# Patient Record
Sex: FEMALE | Race: WHITE | NOT HISPANIC OR LATINO | Employment: FULL TIME | URBAN - METROPOLITAN AREA
[De-identification: names, ages, dates, MRNs, and addresses within clinical notes are randomized per-mention and may not be internally consistent; named-entity substitution may affect disease eponyms.]

---

## 2023-05-03 DIAGNOSIS — Z00.00 ENCOUNTER FOR GENERAL ADULT MEDICAL EXAMINATION WITHOUT ABNORMAL FINDINGS: ICD-10-CM

## 2023-05-03 RX ORDER — MELOXICAM 15 MG/1
TABLET ORAL
Qty: 30 TABLET | Refills: 1 | Status: SHIPPED | OUTPATIENT
Start: 2023-05-03 | End: 2023-06-12 | Stop reason: SDUPTHER

## 2023-05-13 DIAGNOSIS — Z00.00 ENCOUNTER FOR GENERAL ADULT MEDICAL EXAMINATION WITHOUT ABNORMAL FINDINGS: ICD-10-CM

## 2023-05-15 RX ORDER — LISINOPRIL 20 MG/1
20 TABLET ORAL DAILY
Qty: 90 TABLET | Refills: 3 | Status: SHIPPED | OUTPATIENT
Start: 2023-05-15 | End: 2023-06-12 | Stop reason: SDUPTHER

## 2023-06-06 PROBLEM — R73.09 ABNORMAL GLUCOSE LEVEL: Status: ACTIVE | Noted: 2023-06-06

## 2023-06-06 PROBLEM — G43.909 MIGRAINES: Status: ACTIVE | Noted: 2023-06-06

## 2023-06-06 PROBLEM — E04.9 GOITER: Status: ACTIVE | Noted: 2023-06-06

## 2023-06-06 PROBLEM — F45.41 STRESS HEADACHES: Status: ACTIVE | Noted: 2023-06-06

## 2023-06-06 PROBLEM — K58.0 IRRITABLE BOWEL SYNDROME WITH DIARRHEA: Status: ACTIVE | Noted: 2023-06-06

## 2023-06-06 PROBLEM — J02.9 SORE THROAT: Status: ACTIVE | Noted: 2023-06-06

## 2023-06-06 PROBLEM — R42 VERTIGO: Status: ACTIVE | Noted: 2023-06-06

## 2023-06-06 PROBLEM — M19.90 ARTHRITIS PAIN: Status: ACTIVE | Noted: 2023-06-06

## 2023-06-06 PROBLEM — K21.9 GERD (GASTROESOPHAGEAL REFLUX DISEASE): Status: ACTIVE | Noted: 2023-06-06

## 2023-06-06 PROBLEM — H93.8X9 EAR PRESSURE: Status: ACTIVE | Noted: 2023-06-06

## 2023-06-06 PROBLEM — U07.1 DISEASE DUE TO SEVERE ACUTE RESPIRATORY SYNDROME CORONAVIRUS 2 (SARS-COV-2): Status: ACTIVE | Noted: 2023-06-06

## 2023-06-06 PROBLEM — M62.830 BACK SPASM: Status: ACTIVE | Noted: 2023-06-06

## 2023-06-06 PROBLEM — J02.9 ACUTE PHARYNGITIS: Status: ACTIVE | Noted: 2023-06-06

## 2023-06-06 PROBLEM — I10 BENIGN ESSENTIAL HYPERTENSION: Status: ACTIVE | Noted: 2023-06-06

## 2023-06-06 PROBLEM — R05.3 PERSISTENT COUGH: Status: ACTIVE | Noted: 2023-06-06

## 2023-06-06 PROBLEM — J30.9 ALLERGIC RHINITIS: Status: ACTIVE | Noted: 2023-06-06

## 2023-06-06 LAB
ALANINE AMINOTRANSFERASE (SGPT) (U/L) IN SER/PLAS: 16 U/L (ref 7–45)
ALBUMIN (G/DL) IN SER/PLAS: 4.3 G/DL (ref 3.4–5)
ALKALINE PHOSPHATASE (U/L) IN SER/PLAS: 64 U/L (ref 33–110)
ANION GAP IN SER/PLAS: 13 MMOL/L (ref 10–20)
ASPARTATE AMINOTRANSFERASE (SGOT) (U/L) IN SER/PLAS: 18 U/L (ref 9–39)
BASOPHILS (10*3/UL) IN BLOOD BY AUTOMATED COUNT: 0.05 X10E9/L (ref 0–0.1)
BASOPHILS/100 LEUKOCYTES IN BLOOD BY AUTOMATED COUNT: 0.8 % (ref 0–2)
BILIRUBIN TOTAL (MG/DL) IN SER/PLAS: 0.5 MG/DL (ref 0–1.2)
CALCIUM (MG/DL) IN SER/PLAS: 9.4 MG/DL (ref 8.6–10.3)
CARBON DIOXIDE, TOTAL (MMOL/L) IN SER/PLAS: 27 MMOL/L (ref 21–32)
CHLORIDE (MMOL/L) IN SER/PLAS: 106 MMOL/L (ref 98–107)
CHOLESTEROL (MG/DL) IN SER/PLAS: 179 MG/DL (ref 0–199)
CHOLESTEROL IN HDL (MG/DL) IN SER/PLAS: 48.5 MG/DL
CHOLESTEROL/HDL RATIO: 3.7
CREATININE (MG/DL) IN SER/PLAS: 0.92 MG/DL (ref 0.5–1.05)
EOSINOPHILS (10*3/UL) IN BLOOD BY AUTOMATED COUNT: 0.18 X10E9/L (ref 0–0.7)
EOSINOPHILS/100 LEUKOCYTES IN BLOOD BY AUTOMATED COUNT: 3 % (ref 0–6)
ERYTHROCYTE DISTRIBUTION WIDTH (RATIO) BY AUTOMATED COUNT: 12.3 % (ref 11.5–14.5)
ERYTHROCYTE MEAN CORPUSCULAR HEMOGLOBIN CONCENTRATION (G/DL) BY AUTOMATED: 32.4 G/DL (ref 32–36)
ERYTHROCYTE MEAN CORPUSCULAR VOLUME (FL) BY AUTOMATED COUNT: 92 FL (ref 80–100)
ERYTHROCYTES (10*6/UL) IN BLOOD BY AUTOMATED COUNT: 4.53 X10E12/L (ref 4–5.2)
GFR FEMALE: 72 ML/MIN/1.73M2
GLUCOSE (MG/DL) IN SER/PLAS: 82 MG/DL (ref 74–99)
HEMATOCRIT (%) IN BLOOD BY AUTOMATED COUNT: 41.7 % (ref 36–46)
HEMOGLOBIN (G/DL) IN BLOOD: 13.5 G/DL (ref 12–16)
IMMATURE GRANULOCYTES/100 LEUKOCYTES IN BLOOD BY AUTOMATED COUNT: 0.3 % (ref 0–0.9)
LDL: 117 MG/DL (ref 0–99)
LEUKOCYTES (10*3/UL) IN BLOOD BY AUTOMATED COUNT: 6.1 X10E9/L (ref 4.4–11.3)
LYMPHOCYTES (10*3/UL) IN BLOOD BY AUTOMATED COUNT: 1.93 X10E9/L (ref 1.2–4.8)
LYMPHOCYTES/100 LEUKOCYTES IN BLOOD BY AUTOMATED COUNT: 31.6 % (ref 13–44)
MONOCYTES (10*3/UL) IN BLOOD BY AUTOMATED COUNT: 0.41 X10E9/L (ref 0.1–1)
MONOCYTES/100 LEUKOCYTES IN BLOOD BY AUTOMATED COUNT: 6.7 % (ref 2–10)
NEUTROPHILS (10*3/UL) IN BLOOD BY AUTOMATED COUNT: 3.51 X10E9/L (ref 1.2–7.7)
NEUTROPHILS/100 LEUKOCYTES IN BLOOD BY AUTOMATED COUNT: 57.6 % (ref 40–80)
PLATELETS (10*3/UL) IN BLOOD AUTOMATED COUNT: 206 X10E9/L (ref 150–450)
POTASSIUM (MMOL/L) IN SER/PLAS: 4.2 MMOL/L (ref 3.5–5.3)
PROTEIN TOTAL: 6.7 G/DL (ref 6.4–8.2)
SODIUM (MMOL/L) IN SER/PLAS: 142 MMOL/L (ref 136–145)
THYROTROPIN (MIU/L) IN SER/PLAS BY DETECTION LIMIT <= 0.05 MIU/L: 2.79 MIU/L (ref 0.44–3.98)
TRIGLYCERIDE (MG/DL) IN SER/PLAS: 66 MG/DL (ref 0–149)
UREA NITROGEN (MG/DL) IN SER/PLAS: 16 MG/DL (ref 6–23)
VLDL: 13 MG/DL (ref 0–40)

## 2023-06-12 ENCOUNTER — OFFICE VISIT (OUTPATIENT)
Dept: PRIMARY CARE | Facility: CLINIC | Age: 58
End: 2023-06-12
Payer: COMMERCIAL

## 2023-06-12 VITALS
BODY MASS INDEX: 31.99 KG/M2 | DIASTOLIC BLOOD PRESSURE: 80 MMHG | WEIGHT: 216 LBS | SYSTOLIC BLOOD PRESSURE: 138 MMHG | HEART RATE: 85 BPM | TEMPERATURE: 97.4 F | RESPIRATION RATE: 16 BRPM | OXYGEN SATURATION: 98 % | HEIGHT: 69 IN

## 2023-06-12 DIAGNOSIS — Z00.00 ENCOUNTER FOR GENERAL ADULT MEDICAL EXAMINATION WITHOUT ABNORMAL FINDINGS: ICD-10-CM

## 2023-06-12 DIAGNOSIS — R42 VERTIGO: Primary | ICD-10-CM

## 2023-06-12 DIAGNOSIS — E55.9 VITAMIN D DEFICIENCY: ICD-10-CM

## 2023-06-12 DIAGNOSIS — E04.9 GOITER: ICD-10-CM

## 2023-06-12 PROCEDURE — 3075F SYST BP GE 130 - 139MM HG: CPT | Performed by: INTERNAL MEDICINE

## 2023-06-12 PROCEDURE — 1036F TOBACCO NON-USER: CPT | Performed by: INTERNAL MEDICINE

## 2023-06-12 PROCEDURE — 99396 PREV VISIT EST AGE 40-64: CPT | Performed by: INTERNAL MEDICINE

## 2023-06-12 PROCEDURE — 3079F DIAST BP 80-89 MM HG: CPT | Performed by: INTERNAL MEDICINE

## 2023-06-12 RX ORDER — IBUPROFEN 600 MG/1
600 TABLET ORAL EVERY 6 HOURS PRN
COMMUNITY
Start: 2016-08-25

## 2023-06-12 RX ORDER — MELOXICAM 15 MG/1
TABLET ORAL
Qty: 90 TABLET | Refills: 3 | Status: SHIPPED | OUTPATIENT
Start: 2023-06-12

## 2023-06-12 RX ORDER — FAMOTIDINE 20 MG/1
20 TABLET, FILM COATED ORAL 2 TIMES DAILY
COMMUNITY

## 2023-06-12 RX ORDER — ERGOCALCIFEROL 1.25 MG/1
50000 CAPSULE ORAL
COMMUNITY
Start: 2015-10-28 | End: 2023-06-12 | Stop reason: SDUPTHER

## 2023-06-12 RX ORDER — FLUTICASONE PROPIONATE 50 MCG
2 SPRAY, SUSPENSION (ML) NASAL DAILY
COMMUNITY
End: 2023-06-15

## 2023-06-12 RX ORDER — MECLIZINE HYDROCHLORIDE 25 MG/1
25 TABLET ORAL 3 TIMES DAILY PRN
Qty: 30 TABLET | Refills: 3 | Status: SHIPPED | OUTPATIENT
Start: 2023-06-12

## 2023-06-12 RX ORDER — OMEGA-3 FATTY ACIDS/FISH OIL 300-500 MG
CAPSULE ORAL
COMMUNITY

## 2023-06-12 RX ORDER — METHOCARBAMOL 500 MG/1
500 TABLET, FILM COATED ORAL
COMMUNITY

## 2023-06-12 RX ORDER — MECLIZINE HYDROCHLORIDE 25 MG/1
25 TABLET ORAL
COMMUNITY
Start: 2021-02-12 | End: 2023-06-12 | Stop reason: SDUPTHER

## 2023-06-12 RX ORDER — ERGOCALCIFEROL 1.25 MG/1
50000 CAPSULE ORAL
Qty: 12 CAPSULE | Refills: 3 | Status: SHIPPED | OUTPATIENT
Start: 2023-06-12

## 2023-06-12 RX ORDER — LISINOPRIL 20 MG/1
20 TABLET ORAL DAILY
Qty: 90 TABLET | Refills: 3 | Status: SHIPPED | OUTPATIENT
Start: 2023-06-12

## 2023-06-12 ASSESSMENT — ENCOUNTER SYMPTOMS
DIZZINESS: 1
ABDOMINAL PAIN: 0
TROUBLE SWALLOWING: 0
DIARRHEA: 1

## 2023-06-12 ASSESSMENT — PATIENT HEALTH QUESTIONNAIRE - PHQ9
SUM OF ALL RESPONSES TO PHQ9 QUESTIONS 1 AND 2: 0
2. FEELING DOWN, DEPRESSED OR HOPELESS: NOT AT ALL
1. LITTLE INTEREST OR PLEASURE IN DOING THINGS: NOT AT ALL

## 2023-06-12 NOTE — PROGRESS NOTES
Patient here for a physical     Subjective   Patient ID: Iris Fraire is a 57 y.o. female who presents for Annual Exam.    The patient reports ongoing intermittent episodes of vertigo, particularly when she turns her head to the right side.  She manages symptoms by taking meclizine 25mg as 1 tablet as needed, and finds this has been working well.    The patient has scheduled her routine screening colonoscopy on 6/16/2023.  She recalls that her most recent endoscopy was generally unremarkable and was negative for H.pylori.  She has been taking Pepcid on an as needed basis.  She denies any dysphagia or abdominal pain, but endorses loose stools associated with IBS.  She continues to follow with Gastroenterology.    The patient reports hip pain due to osteoarthritis, and has been taking meloxicam 15 mg once daily for pain control.    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    The patient denies any hearing impairment or vision changes and wears prescription lenses.    The patient continues to take Vitamin D2 as 95544 units once monthly, and has been doing so for some time.    The patient's grandmother has a history of thyroid disease treated with Synthroid.        Review of Systems   HENT:  Negative for hearing loss and trouble swallowing.    Eyes:  Negative for visual disturbance.   Gastrointestinal:  Positive for diarrhea. Negative for abdominal pain.   Neurological:  Positive for dizziness.   Psychiatric/Behavioral:          Positive for increased life stress.       Objective   Physical Exam  Constitutional:       Appearance: Normal appearance.   Cardiovascular:      Rate and Rhythm: Normal rate and regular rhythm.      Heart sounds: Normal heart sounds.      Comments: No carotid bruit bilaterally.  Pulmonary:      Effort: Pulmonary effort is normal.      Breath sounds: Normal breath sounds.   Abdominal:      General: Bowel sounds are normal.       Palpations: Abdomen is soft.      Tenderness: There is abdominal tenderness.   Skin:     General: Skin is warm and dry.   Neurological:      General: No focal deficit present.      Mental Status: She is alert and oriented to person, place, and time. Mental status is at baseline.   Psychiatric:         Mood and Affect: Mood normal.         Behavior: Behavior normal.         Assessment/Plan   Problem List Items Addressed This Visit       Goiter    Relevant Orders    US thyroid    Vertigo - Primary    Relevant Medications    meclizine (Antivert) 25 mg tablet    Other Relevant Orders    Referral to Physical Therapy     Other Visit Diagnoses       Encounter for general adult medical examination without abnormal findings        Relevant Medications    meloxicam (Mobic) 15 mg tablet    lisinopril 20 mg tablet    Vitamin D deficiency        Relevant Medications    ergocalciferol (Vitamin D-2) 1.25 MG (81747 UT) capsule            CPE Performed  -  Discussed healthy diet and regular exercise.    -  Physical exam overall unremarkable. Immunizations reviewed and updated accordingly. Healthy lifestyle choices discussed (tobacco avoidance, appropriate alcohol use, avoidance of illicit substances).   -  Patient is wearing seatbelt.   -  Screening lab work ordered as indicated.    -  Age appropriate screening tests reviewed with patient.        IMPRESSION/PLAN :     Arthritis Pain  - Refilled Meloxicam 15mg, to be taken QD. Take w/ food and Pepcid for protection against adverse effects.  Encouraged patient to take sparingly.    BPPV   - Continue with meclizine 25mg as 1 tablet prn. Order given to patient to complete vestibular rehabilitation. Call if recurs or quality of vertigo changes.     Goiter   - Ultrasound of the thyroid performed on 04/2021 revealed Upper normal sized thyroid gland with few benign-appearing nodules (TIRADS 1), all less than 1 cm. No significant change.  Ordered repeat U/S.    HTN   - BP is 138/80 in office  today. Continue on lisinopril 20mg QD.      Allergic Rhinitis   - Fluticasone propionate 50mcg/act nasal spray refilled to be taken QD for seasonal allergy symptoms.    IBS with diarrhea   - Mild abdominal tenderness on exam.  Soft abdomen, no guarding or rigidity.  Recommended daily Metamucil to help regulate bowel movements. Following with GI.    Vitamin D Deficiency  - Continue with ergocalciferol Vitamin D2 as 47806 units once monthly.  Will recheck prior to next visit.     Health Maintenance   - Routine labs 6/2023. Last colonoscopy 6/2018, scheduled for repeat 6/16/2023- follows w/ Dr. Prather. Mammo and PAP through [facility].  ASCVD Risk 3.8%.     Follow up according to routine health maintenance, call sooner if neede       Scribe Attestation  By signing my name below, IMonika , Scribe   attest that this documentation has been prepared under the direction and in the presence of Deyvi Jackson DO.

## 2023-06-15 DIAGNOSIS — J30.9 ALLERGIC RHINITIS, UNSPECIFIED: ICD-10-CM

## 2023-06-15 RX ORDER — FLUTICASONE PROPIONATE 50 MCG
SPRAY, SUSPENSION (ML) NASAL
Qty: 48 ML | Refills: 1 | Status: SHIPPED | OUTPATIENT
Start: 2023-06-15

## 2023-08-16 ENCOUNTER — TELEPHONE (OUTPATIENT)
Dept: PRIMARY CARE | Facility: CLINIC | Age: 58
End: 2023-08-16
Payer: COMMERCIAL

## 2023-08-16 DIAGNOSIS — R05.9 COUGH, UNSPECIFIED TYPE: Primary | ICD-10-CM

## 2023-08-16 RX ORDER — HYDROCODONE BITARTRATE AND HOMATROPINE METHYLBROMIDE ORAL SOLUTION 5; 1.5 MG/5ML; MG/5ML
5 LIQUID ORAL EVERY 6 HOURS PRN
Qty: 100 ML | Refills: 0 | Status: SHIPPED | OUTPATIENT
Start: 2023-08-16 | End: 2023-08-21

## 2023-08-16 NOTE — TELEPHONE ENCOUNTER
Pt came home from [place] with cough and would like to get RX for cough syrup. Pt advised she does not have any other symptoms. No appt. Available this week. [facility]. Please advise

## 2023-08-18 ENCOUNTER — OFFICE VISIT (OUTPATIENT)
Dept: PRIMARY CARE | Facility: CLINIC | Age: 58
End: 2023-08-18
Payer: COMMERCIAL

## 2023-08-18 VITALS
BODY MASS INDEX: 32.14 KG/M2 | HEIGHT: 69 IN | OXYGEN SATURATION: 98 % | WEIGHT: 217 LBS | HEART RATE: 67 BPM | TEMPERATURE: 97 F | DIASTOLIC BLOOD PRESSURE: 76 MMHG | SYSTOLIC BLOOD PRESSURE: 122 MMHG

## 2023-08-18 DIAGNOSIS — J06.9 URTI (ACUTE UPPER RESPIRATORY INFECTION): Primary | ICD-10-CM

## 2023-08-18 PROCEDURE — 99214 OFFICE O/P EST MOD 30 MIN: CPT | Performed by: INTERNAL MEDICINE

## 2023-08-18 PROCEDURE — 3078F DIAST BP <80 MM HG: CPT | Performed by: INTERNAL MEDICINE

## 2023-08-18 PROCEDURE — 3074F SYST BP LT 130 MM HG: CPT | Performed by: INTERNAL MEDICINE

## 2023-08-18 PROCEDURE — 1036F TOBACCO NON-USER: CPT | Performed by: INTERNAL MEDICINE

## 2023-08-18 RX ORDER — AZITHROMYCIN 250 MG/1
TABLET, FILM COATED ORAL
Qty: 6 TABLET | Refills: 0 | Status: SHIPPED | OUTPATIENT
Start: 2023-08-18 | End: 2023-08-23

## 2023-08-18 RX ORDER — BENZONATATE 100 MG/1
100 CAPSULE ORAL 3 TIMES DAILY PRN
Qty: 42 CAPSULE | Refills: 0 | Status: SHIPPED | OUTPATIENT
Start: 2023-08-18 | End: 2023-09-17

## 2023-08-18 NOTE — PROGRESS NOTES
Subjective   Patient ID: [id number]     Iris Fraire is a 58 y.o. female who presents for Cough (Cough x 5 days and negative for covid).    Current Outpatient Medications:     ergocalciferol (Vitamin D-2) 1.25 MG (89865 UT) capsule, Take 1 capsule (50,000 Units) by mouth every 30 (thirty) days., Disp: 12 capsule, Rfl: 3    famotidine (Pepcid) 20 mg tablet, Take 1 tablet (20 mg) by mouth twice a day., Disp: , Rfl:     fluticasone (Flonase) 50 mcg/actuation nasal spray, SPRAY 2 SPRAYS INTO EACH NOSTRIL EVERY DAY, Disp: 48 mL, Rfl: 1    hydrocodone-homatropine (Hycodan) 5-1.5 mg/5 mL syrup, Take 5 mL by mouth every 6 hours if needed for cough for up to 5 days., Disp: 100 mL, Rfl: 0    ibuprofen 600 mg tablet, Take 1 tablet (600 mg) by mouth every 6 hours if needed., Disp: , Rfl:     lisinopril 20 mg tablet, Take 1 tablet (20 mg) by mouth once daily., Disp: 90 tablet, Rfl: 3    meclizine (Antivert) 25 mg tablet, Take 1 tablet (25 mg) by mouth 3 times a day as needed for dizziness., Disp: 30 tablet, Rfl: 3    meloxicam (Mobic) 15 mg tablet, TAKE 1 TABLET DAILY WITH FOOD AS NEEDED FOR PAIN, Disp: 90 tablet, Rfl: 3    methocarbamol (Robaxin) 500 mg tablet, Take 1 tablet (500 mg) by mouth., Disp: , Rfl:     omega-3 fatty acids-fish oil (Fish OiL) 300-500 mg capsule, Take by mouth., Disp: , Rfl:     azithromycin (Zithromax) 250 mg tablet, Take 2 tablets (500 mg) by mouth once daily for 1 day, THEN 1 tablet (250 mg) once daily for 4 days. Take 2 tabs (500 mg) by mouth today, than 1 daily for 4 days.., Disp: 6 tablet, Rfl: 0    benzonatate (Tessalon) 100 mg capsule, Take 1 capsule (100 mg) by mouth 3 times a day as needed for cough. Do not crush or chew., Disp: 42 capsule, Rfl: 0  HPI  58-year-old patient presenting to the office today to discuss concerns regarding respiratory symptoms.  Patient just came back from vacation in [place] and was on the last day of vacation which is about 5 days ago she started experiencing  "irritation in the back of her throat and cough.  Her cough progressed over the last week and it significantly bothersome mainly at night with minimal sputum production.  She does have irritation in the back of her throat and postnasal drainage.  No sinus pressure congestion no fever chills or rigors.  Review of system was reviewed all normal except what is noted in HPI   No past medical history on file.   Objective   /76 (BP Location: Right arm, Patient Position: Sitting)   Pulse 67   Temp 36.1 °C (97 °F) (Temporal)   Ht 1.74 m (5' 8.5\")   Wt 98.4 kg (217 lb)   SpO2 98%   BMI 32.51 kg/m²      Physical Exam  Constitutional:       Appearance: Normal appearance.   HENT:      Head: Normocephalic and atraumatic.      Mouth/Throat:      Mouth: Mucous membranes are moist.      Pharynx: Posterior oropharyngeal erythema present. No oropharyngeal exudate.   Eyes:      Extraocular Movements: Extraocular movements intact.      Pupils: Pupils are equal, round, and reactive to light.   Cardiovascular:      Rate and Rhythm: Normal rate and regular rhythm.      Pulses: Normal pulses.      Heart sounds: Normal heart sounds.   Pulmonary:      Effort: Pulmonary effort is normal.      Breath sounds: Normal breath sounds.   Neurological:      Mental Status: She is alert.         Assessment/Plan   Problem List Items Addressed This Visit    None  Visit Diagnoses       URTI (acute upper respiratory infection)    -  Primary    Relevant Medications    azithromycin (Zithromax) 250 mg tablet    benzonatate (Tessalon) 100 mg capsule          58-year-old patient with the following issues.    1.  Symptoms suggestive of upper respiratory tract infection at this point we are going to treat the patient with antibiotic namely Zithromax and benzonatate for cough control.  I instructed the patient to take 1 g of vitamin C daily and to drink adequate fluid and use lozenges for irritation in the throat.  Patient stated understanding she was " instructed to seek medical care if her symptoms worsen or do not improve.  Ruthy Escobar MD

## 2023-10-03 ENCOUNTER — EVALUATION (OUTPATIENT)
Dept: PHYSICAL THERAPY | Facility: CLINIC | Age: 58
End: 2023-10-03
Payer: COMMERCIAL

## 2023-10-03 DIAGNOSIS — R42 DIZZINESS: Primary | ICD-10-CM

## 2023-10-03 DIAGNOSIS — T75.3XXA MOTION SICKNESS, INITIAL ENCOUNTER: ICD-10-CM

## 2023-10-03 PROCEDURE — 97161 PT EVAL LOW COMPLEX 20 MIN: CPT | Mod: GP

## 2023-10-03 PROCEDURE — 97112 NEUROMUSCULAR REEDUCATION: CPT | Mod: GP

## 2023-10-03 ASSESSMENT — ENCOUNTER SYMPTOMS
DEPRESSION: 0
LOSS OF SENSATION IN FEET: 0
OCCASIONAL FEELINGS OF UNSTEADINESS: 1

## 2023-10-03 NOTE — Clinical Note
October 3, 2023     Patient: Iris Fraire   YOB: 1965   Date of Visit: 10/3/2023       To Whom it May Concern:    Iris Fraire was seen in my clinic on 10/3/2023. She {Return to school/sport:65928}.    If you have any questions or concerns, please don't hesitate to call.         Sincerely,          Sherman Morse, PT        CC: No Recipients

## 2023-10-03 NOTE — PROGRESS NOTES
Physical Therapy    Physical Therapy Evaluation    Patient Name: Iris Fraire  MRN: [id number]  Today's Date: 10/3/2023  Time Calculation  Start Time: 1124  Stop Time: 1212  Time Calculation (min): 48 min  Visit Number:  1  Insurance Information:  50 visits/year    Therapy Diagnosis:  -Dizziness R42  -Motion Sickness T75.3XXA      Assessment: Iris Fraire is a 58 year old F referred to outpatient PT for dizziness. Objective testing of vestibular, oculomotor, orthostatic screening, and functional mvmnt screenings were unable to provoke pt's familiar sx.  Based on subjective information pt may have some degree of motion sensitivity. However, it is difficult to ascertain a specific cause for pt's dizziness at this time, due to being unable to provoke pt's familiar sx. As a result, recommending no skilled vestibular PT needs at this time. Pt to follow up as necessary during more acute flare up of familiar sx.     Treatment: 10min (Neuro re-ed)  Pt educated on multifactorial nature of dizziness including but not limited to vestibular, cardiovascular, and visual systems. Pt educated on role of this visit in ruling out vestibular system causes at this time, and importance of noting trends of sx provocation going forward to help determine contributing factors.     Problems include:  Dizziness, lightheadedness, unsteady gait.     Goals:  No goals established.       Plan:  No skilled vestibular PT needs at this time. Pt to follow up as necessary.     Activities that may be performed include but are not limited to:  balance, gait, transfers, modalities (as appropriate), e-stim (as appropriate), canalith repositioning maneuvers, therapeutic exercise, therapeutic activities.    Iris Fraire in agreement with and understanding of all discussed this date.    ----------------------------------  ----------------------------------    Subjective:  Pt reports experiencing intermittent bouts of dizzy spells and lightheadedness  "beginning last spring (2023). Pt reports sx have included feeling as though the floor was moving, room was spinning, abnormal gait and lightheadedness. Pt reports the most recent episode was approx 1 week ago after quickly getting out of bed. Other episodes brought on by quick head mvmnts and rolling over in bed. Pt reports 1 episode several months where she lowered herself to ground d/t room spinning to avoid a fall before going to ED. Pt reports taking meclizine PRN which has helped. Pt reports she wants to figure out if there is a maneuver to get these episodes to stop.     Onset Date:  Spring 2023.     HPI:  Iris Fraire is a 58 year old F referred to outpatient PT for dizziness.    note:  \"y\" = yes; \"n\" = no)    Hx of:   - Anxiety n  - Headaches / migraines (photo/phonophobia) y, Hasn't had migraines in a while.   - Concussion n  - CVA n  - Neck pain n, WFL   - TMD n  - Motion sensitivity (car, boat) y, gets car sick only in back seat.   - Sinus infections n  - Ear infections y, as a kid  - Heart conditions (afib, HTN, OH) n  - DM n  - Autoimmune disorders   - Thyroid disorders multinodular goiters  - Alcohol use y, 1/2-3months  - Caffeine use y, 2 cups coffee/day    Hearing:  - Loss (sudden or gradual) y, partially deaf L ear.   - Tinnitus y, occasional ringing with dizzy spells.   - Audiogram n  - Autophony n    Eye Conditions: y, potential cataract.     Vestibular Tests:   - VNG n  - VEMP n    Imaging:  - CT y, reports normal results  - MRI y, reports normal results     ----------------------------------  ----------------------------------    OBJECTIVE    Observation: No gross abnormalities noted w/functional mobility.     ROM: Cervical spine ROM WFL, pt noted \"creakiness\" w/out pain.     Palpation: No sx provocation, pain or trigger points noted w/palpation of suboccipital, upper trap, scalene, and levator scapula musculature.     Strength: No gross abnormalities noted w/functional mobility. "     Coordination: No gross abnormalities noted w/functional mobility.     Vestibular: (“g” = goggles)  Occulomotor -   (Key: n = negative, p = positive)  - ROM: n  - Smooth Pursuit:  n  - Horizontal Saccades:  n  - Vertical Saccades:  n  - Eye Alignment:  n  - Static Visual Acuity: n  - Cover:  n  - Uncover:  n  - Cross Cover:  n  - Skew Deviation:  n  - Convergence:  n    Vestibular-Specific:  - Spontaneous Nystagmus: n  - Gaze Evoked Nystagmus:  n  - Horizontal VOR: n  - Vertical VOR:  n  - R Head Thrust:  n  - L Head Thrust:  n    Positional Testing:  - R DHP: n  - R HRT:  n  - L DHP: n  - L HRT:  n    Orthostatic Screening:  Position Supine Standing   Method Automatic Automatic   Placement L Upper arm L upper arm   BP Measure (mmHg) 135/68 136/67   HR (bpm) 65 77         Functional Mobility Assessment:  - Gait: No gross abnormalities noted w/functional mobility  - Transfers:  No gross abnormalities noted w/functional mobility.   - Bed Mobility:  No gross abnormalities noted w/functional mobility.   - Other: No sx provocation with the following:  Turn 360 degrees to the R & L, Bend forward from the waist to  object.     Outcomes:  DHI:  14% impairment

## 2023-10-03 NOTE — Clinical Note
October 3, 2023     Patient: Iris Fraire   YOB: 1965   Date of Visit: 10/3/2023       To Whom It May Concern:    It is my medical opinion that Iris Fraire {Work release (duty restriction):97196}.    If you have any questions or concerns, please don't hesitate to call.         Sincerely,        Sherman Morse, PT    CC: No Recipients

## 2023-12-16 ENCOUNTER — HOSPITAL ENCOUNTER (OUTPATIENT)
Dept: RADIOLOGY | Facility: HOSPITAL | Age: 58
Discharge: HOME | End: 2023-12-16
Payer: COMMERCIAL

## 2023-12-16 DIAGNOSIS — E04.9 NONTOXIC GOITER, UNSPECIFIED: ICD-10-CM

## 2023-12-16 PROCEDURE — 76536 US EXAM OF HEAD AND NECK: CPT

## 2023-12-16 PROCEDURE — 76536 US EXAM OF HEAD AND NECK: CPT | Performed by: RADIOLOGY

## 2023-12-19 DIAGNOSIS — K58.9 IRRITABLE BOWEL SYNDROME, UNSPECIFIED TYPE: Primary | ICD-10-CM

## 2023-12-19 RX ORDER — HYOSCYAMINE SULFATE 0.125 MG
0.12 TABLET ORAL 2 TIMES DAILY PRN
COMMUNITY
End: 2023-12-19 | Stop reason: SDUPTHER

## 2023-12-19 RX ORDER — HYOSCYAMINE SULFATE 0.125 MG
0.12 TABLET ORAL 2 TIMES DAILY PRN
Qty: 30 TABLET | Refills: 1 | Status: SHIPPED | OUTPATIENT
Start: 2023-12-19 | End: 2024-01-03 | Stop reason: SDUPTHER

## 2024-01-03 DIAGNOSIS — K58.9 IRRITABLE BOWEL SYNDROME, UNSPECIFIED TYPE: ICD-10-CM

## 2024-01-03 RX ORDER — HYOSCYAMINE SULFATE 0.125 MG
0.12 TABLET ORAL 2 TIMES DAILY PRN
Qty: 30 TABLET | Refills: 0 | Status: SHIPPED | OUTPATIENT
Start: 2024-01-03 | End: 2024-01-05 | Stop reason: SDUPTHER

## 2024-01-05 DIAGNOSIS — K58.9 IRRITABLE BOWEL SYNDROME, UNSPECIFIED TYPE: ICD-10-CM

## 2024-01-05 RX ORDER — HYOSCYAMINE SULFATE 0.125 MG
0.12 TABLET ORAL 2 TIMES DAILY PRN
Qty: 180 TABLET | Refills: 0 | Status: SHIPPED | OUTPATIENT
Start: 2024-01-05

## 2024-05-28 ENCOUNTER — TELEPHONE (OUTPATIENT)
Dept: PRIMARY CARE | Facility: CLINIC | Age: 59
End: 2024-05-28
Payer: COMMERCIAL

## 2024-05-28 DIAGNOSIS — Z00.00 HEALTHCARE MAINTENANCE: Primary | ICD-10-CM

## 2024-06-12 ENCOUNTER — LAB (OUTPATIENT)
Dept: LAB | Facility: LAB | Age: 59
End: 2024-06-12
Payer: COMMERCIAL

## 2024-06-12 DIAGNOSIS — Z00.00 HEALTHCARE MAINTENANCE: ICD-10-CM

## 2024-06-12 LAB
ALBUMIN SERPL BCP-MCNC: 4.4 G/DL (ref 3.4–5)
ALP SERPL-CCNC: 64 U/L (ref 33–110)
ALT SERPL W P-5'-P-CCNC: 12 U/L (ref 7–45)
ANION GAP SERPL CALC-SCNC: 12 MMOL/L (ref 10–20)
AST SERPL W P-5'-P-CCNC: 15 U/L (ref 9–39)
BASOPHILS # BLD AUTO: 0.05 X10*3/UL (ref 0–0.1)
BASOPHILS NFR BLD AUTO: 0.8 %
BILIRUB SERPL-MCNC: 0.5 MG/DL (ref 0–1.2)
BUN SERPL-MCNC: 22 MG/DL (ref 6–23)
CALCIUM SERPL-MCNC: 9.5 MG/DL (ref 8.6–10.6)
CHLORIDE SERPL-SCNC: 106 MMOL/L (ref 98–107)
CHOLEST SERPL-MCNC: 175 MG/DL (ref 0–199)
CHOLESTEROL/HDL RATIO: 3.3
CO2 SERPL-SCNC: 29 MMOL/L (ref 21–32)
CREAT SERPL-MCNC: 0.89 MG/DL (ref 0.5–1.05)
EGFRCR SERPLBLD CKD-EPI 2021: 75 ML/MIN/1.73M*2
EOSINOPHIL # BLD AUTO: 0.12 X10*3/UL (ref 0–0.7)
EOSINOPHIL NFR BLD AUTO: 1.8 %
ERYTHROCYTE [DISTWIDTH] IN BLOOD BY AUTOMATED COUNT: 12.4 % (ref 11.5–14.5)
GLUCOSE SERPL-MCNC: 82 MG/DL (ref 74–99)
HCT VFR BLD AUTO: 42.3 % (ref 36–46)
HDLC SERPL-MCNC: 52.5 MG/DL
HGB BLD-MCNC: 14 G/DL (ref 12–16)
IMM GRANULOCYTES # BLD AUTO: 0.01 X10*3/UL (ref 0–0.7)
IMM GRANULOCYTES NFR BLD AUTO: 0.2 % (ref 0–0.9)
LDLC SERPL CALC-MCNC: 108 MG/DL
LYMPHOCYTES # BLD AUTO: 2.25 X10*3/UL (ref 1.2–4.8)
LYMPHOCYTES NFR BLD AUTO: 33.9 %
MCH RBC QN AUTO: 30.1 PG (ref 26–34)
MCHC RBC AUTO-ENTMCNC: 33.1 G/DL (ref 32–36)
MCV RBC AUTO: 91 FL (ref 80–100)
MONOCYTES # BLD AUTO: 0.41 X10*3/UL (ref 0.1–1)
MONOCYTES NFR BLD AUTO: 6.2 %
NEUTROPHILS # BLD AUTO: 3.8 X10*3/UL (ref 1.2–7.7)
NEUTROPHILS NFR BLD AUTO: 57.1 %
NON HDL CHOLESTEROL: 123 MG/DL (ref 0–149)
NRBC BLD-RTO: 0 /100 WBCS (ref 0–0)
PLATELET # BLD AUTO: 217 X10*3/UL (ref 150–450)
POTASSIUM SERPL-SCNC: 4.4 MMOL/L (ref 3.5–5.3)
PROT SERPL-MCNC: 6.3 G/DL (ref 6.4–8.2)
RBC # BLD AUTO: 4.65 X10*6/UL (ref 4–5.2)
SODIUM SERPL-SCNC: 143 MMOL/L (ref 136–145)
TRIGL SERPL-MCNC: 71 MG/DL (ref 0–149)
TSH SERPL-ACNC: 2.02 MIU/L (ref 0.44–3.98)
VLDL: 14 MG/DL (ref 0–40)
WBC # BLD AUTO: 6.6 X10*3/UL (ref 4.4–11.3)

## 2024-06-12 PROCEDURE — 84443 ASSAY THYROID STIM HORMONE: CPT

## 2024-06-12 PROCEDURE — 85025 COMPLETE CBC W/AUTO DIFF WBC: CPT

## 2024-06-12 PROCEDURE — 80061 LIPID PANEL: CPT

## 2024-06-12 PROCEDURE — 80053 COMPREHEN METABOLIC PANEL: CPT

## 2024-06-12 PROCEDURE — 36415 COLL VENOUS BLD VENIPUNCTURE: CPT

## 2024-06-23 DIAGNOSIS — Z00.00 ENCOUNTER FOR GENERAL ADULT MEDICAL EXAMINATION WITHOUT ABNORMAL FINDINGS: ICD-10-CM

## 2024-06-24 ENCOUNTER — APPOINTMENT (OUTPATIENT)
Dept: PRIMARY CARE | Facility: CLINIC | Age: 59
End: 2024-06-24
Payer: COMMERCIAL

## 2024-06-24 VITALS
RESPIRATION RATE: 16 BRPM | OXYGEN SATURATION: 95 % | HEART RATE: 87 BPM | DIASTOLIC BLOOD PRESSURE: 70 MMHG | WEIGHT: 214 LBS | BODY MASS INDEX: 31.7 KG/M2 | SYSTOLIC BLOOD PRESSURE: 120 MMHG | HEIGHT: 69 IN | TEMPERATURE: 96.8 F

## 2024-06-24 DIAGNOSIS — K21.9 GASTROESOPHAGEAL REFLUX DISEASE WITHOUT ESOPHAGITIS: Primary | ICD-10-CM

## 2024-06-24 DIAGNOSIS — J35.8 TONSIL STONE: ICD-10-CM

## 2024-06-24 DIAGNOSIS — N81.10 FEMALE BLADDER PROLAPSE: ICD-10-CM

## 2024-06-24 DIAGNOSIS — K58.9 IRRITABLE BOWEL SYNDROME, UNSPECIFIED TYPE: ICD-10-CM

## 2024-06-24 DIAGNOSIS — Z00.00 ENCOUNTER FOR GENERAL ADULT MEDICAL EXAMINATION WITHOUT ABNORMAL FINDINGS: ICD-10-CM

## 2024-06-24 DIAGNOSIS — M62.830 BACK SPASM: ICD-10-CM

## 2024-06-24 PROCEDURE — 99396 PREV VISIT EST AGE 40-64: CPT | Performed by: INTERNAL MEDICINE

## 2024-06-24 PROCEDURE — 3078F DIAST BP <80 MM HG: CPT | Performed by: INTERNAL MEDICINE

## 2024-06-24 PROCEDURE — 3074F SYST BP LT 130 MM HG: CPT | Performed by: INTERNAL MEDICINE

## 2024-06-24 PROCEDURE — 1036F TOBACCO NON-USER: CPT | Performed by: INTERNAL MEDICINE

## 2024-06-24 PROCEDURE — 93000 ELECTROCARDIOGRAM COMPLETE: CPT | Performed by: INTERNAL MEDICINE

## 2024-06-24 RX ORDER — HYOSCYAMINE SULFATE 0.125 MG
0.12 TABLET ORAL 2 TIMES DAILY PRN
Qty: 180 TABLET | Refills: 0 | Status: SHIPPED | OUTPATIENT
Start: 2024-06-24

## 2024-06-24 RX ORDER — LISINOPRIL 20 MG/1
20 TABLET ORAL DAILY
Qty: 90 TABLET | Refills: 3 | Status: SHIPPED | OUTPATIENT
Start: 2024-06-24

## 2024-06-24 RX ORDER — METHOCARBAMOL 500 MG/1
500 TABLET, FILM COATED ORAL 3 TIMES DAILY
Qty: 30 TABLET | Refills: 1 | Status: SHIPPED | OUTPATIENT
Start: 2024-06-24

## 2024-06-24 RX ORDER — MELOXICAM 15 MG/1
TABLET ORAL
Qty: 90 TABLET | Refills: 3 | Status: SHIPPED | OUTPATIENT
Start: 2024-06-24

## 2024-06-24 RX ORDER — MELOXICAM 15 MG/1
TABLET ORAL
Qty: 90 TABLET | Refills: 3 | Status: SHIPPED | OUTPATIENT
Start: 2024-06-24 | End: 2024-06-24 | Stop reason: SDUPTHER

## 2024-06-24 RX ORDER — FAMOTIDINE 20 MG/1
20 TABLET, FILM COATED ORAL DAILY
Qty: 90 TABLET | Refills: 2 | Status: SHIPPED | OUTPATIENT
Start: 2024-06-24

## 2024-06-24 ASSESSMENT — ENCOUNTER SYMPTOMS
DIARRHEA: 1
BACK PAIN: 1
SHORTNESS OF BREATH: 0
ABDOMINAL PAIN: 0
CONSTIPATION: 0
SLEEP DISTURBANCE: 1

## 2024-06-24 NOTE — PROGRESS NOTES
Patient here for a physical     Subjective   Patient ID: Iris Fraire is a 58 y.o. female who presents for Annual Exam.    The patient recently had a skin lesion removed that was confirmed to be skin cancer, and will be following up with her Dermatologist, , every six months.    The patient notes worsening tonsil stones particularly on one side.  This is associated with a foul odor that is unusual for the patient.     The patient is concerned about bladder prolapse despite completing physical therapy for pelvic floor therapy.  She has a history of hernia repair with mesh.  The patient has an upcoming appointment with Gynecology in 9/2024.     The patient reports significantly increased life stress related to caring for her mother who is experiencing progressive dementia.  This has resulted in sleep disturbance, as she has been unable to stay asleep for longer than an hour.        The patient has been taking meloxicam 15mg once daily for hip and back pain.  She has been trying to walk more regularly in an effort to improve physical conditioning.  The patient denies any dyspnea, chest pressure, or chest pain.     The patient states ongoing diarrhea which she believes is related to previous gastrointestinal surgery.  She completed a colonoscopy in 6/2023 and will be due for repeat in 6/2028.     The patient denies any significant hearing impairment or vision changes.        Review of Systems   HENT:  Negative for hearing loss.         Positive for tonsil stones.    Respiratory:  Negative for shortness of breath.    Cardiovascular:  Negative for chest pain.   Gastrointestinal:  Positive for diarrhea. Negative for abdominal pain and constipation.   Genitourinary:         Positive for bladder prolapse.   Musculoskeletal:  Positive for back pain.        Positive for hip pain.    Psychiatric/Behavioral:  Positive for sleep disturbance.         Positive for life stress.       Objective   Physical  Exam  Constitutional:       Appearance: Normal appearance.   Neck:      Vascular: No carotid bruit.   Cardiovascular:      Rate and Rhythm: Normal rate and regular rhythm.      Heart sounds: Normal heart sounds.   Pulmonary:      Effort: Pulmonary effort is normal.      Breath sounds: Normal breath sounds.   Abdominal:      General: Bowel sounds are normal.      Palpations: Abdomen is soft.      Tenderness: There is no abdominal tenderness.   Skin:     General: Skin is warm and dry.   Neurological:      General: No focal deficit present.      Mental Status: She is alert and oriented to person, place, and time. Mental status is at baseline.   Psychiatric:         Mood and Affect: Mood normal.         Behavior: Behavior normal.       Assessment/Plan   Problem List Items Addressed This Visit             ICD-10-CM    Back spasm M62.830    Relevant Medications    methocarbamol (Robaxin) 500 mg tablet    GERD (gastroesophageal reflux disease) - Primary K21.9    Relevant Medications    famotidine (Pepcid) 20 mg tablet     Other Visit Diagnoses         Codes    Encounter for general adult medical examination without abnormal findings     Z00.00    Relevant Medications    meloxicam (Mobic) 15 mg tablet    lisinopril 20 mg tablet    Irritable bowel syndrome, unspecified type     K58.9    Relevant Medications    hyoscyamine (Anaspaz, Levsin) 0.125 mg tablet    Tonsil stone     J35.8    Relevant Orders    Referral to ENT    Female bladder prolapse     N81.10    Relevant Orders    Referral to Urogynecology            CPE Performed  -  Discussed healthy diet and regular exercise.    -  Physical exam overall unremarkable. Immunizations reviewed and updated accordingly. Healthy lifestyle choices discussed (tobacco avoidance, appropriate alcohol use, avoidance of illicit substances).   -  Patient is wearing seatbelt.   -  Screening lab work ordered as indicated.    -  Age appropriate screening tests reviewed with patient.         IMPRESSION/PLAN :     Female Bladder Prolapse  - Ordered referral to Urogynecology with .    Sleep Disturbance  - Advised patient to try Sleep 3 [employer]'s Bounty Melatonin.    Tonsil Stones  - Ordered referral to Otolaryngology with .    HTN   - BP is 120/70 in office today. Continue on lisinopril 20mg QD.      Goiter   - Ultrasound of the thyroid performed on 04/2021 revealed Upper normal sized thyroid gland with few benign-appearing nodules (TIRADS 1), all less than 1 cm. No significant change.  Stable per 12/2023 repeat U/S.    Arthritis Pain  - Refilled Meloxicam 15mg, to be taken QD. Take w/ food and Pepcid for protection against adverse effects.  Encouraged patient to take sparingly.     BPPV   - Continue with meclizine 25mg as 1 tablet prn. Previously ordered vestibular rehabilitation. Call if recurs or quality of vertigo changes.     Allergic Rhinitis   - Fluticasone propionate 50mcg/act nasal spray refilled to be taken QD for seasonal allergy symptoms.     IBS with diarrhea   - Mild abdominal tenderness on exam.  Soft abdomen, no guarding or rigidity.  Recommended daily Metamucil to help regulate bowel movements. Following with GI.     Vitamin D Deficiency  - Continue with ergocalciferol Vitamin D2 as 81013 units once monthly.  Will recheck prior to next visit.     Health Maintenance   - Routine labs 6/2024. Mammo and PAP through [facility].  Last Mammogram 12/2023.  Last colonoscopy 6/2023, repeat due 6/2028. Shingrix UTD.     Follow up according to routine health maintenance, call sooner if neede       Scribe Attestation  By signing my name below, I, Monika Escalante, Scribe   attest that this documentation has been prepared under the direction and in the presence of Deyvi Jackson DO.   Monika Escalante 06/24/24 9:10 AM

## 2024-07-15 ENCOUNTER — APPOINTMENT (OUTPATIENT)
Dept: OBSTETRICS AND GYNECOLOGY | Facility: CLINIC | Age: 59
End: 2024-07-15
Payer: COMMERCIAL

## 2024-07-15 VITALS
BODY MASS INDEX: 31.7 KG/M2 | HEIGHT: 69 IN | SYSTOLIC BLOOD PRESSURE: 122 MMHG | WEIGHT: 214 LBS | DIASTOLIC BLOOD PRESSURE: 80 MMHG

## 2024-07-15 DIAGNOSIS — N95.1 VASOMOTOR SYMPTOMS DUE TO MENOPAUSE: ICD-10-CM

## 2024-07-15 DIAGNOSIS — N81.10 FEMALE BLADDER PROLAPSE: ICD-10-CM

## 2024-07-15 DIAGNOSIS — N39.3 SUI (STRESS URINARY INCONTINENCE, FEMALE): Primary | ICD-10-CM

## 2024-07-15 PROCEDURE — 3079F DIAST BP 80-89 MM HG: CPT | Performed by: STUDENT IN AN ORGANIZED HEALTH CARE EDUCATION/TRAINING PROGRAM

## 2024-07-15 PROCEDURE — 1036F TOBACCO NON-USER: CPT | Performed by: STUDENT IN AN ORGANIZED HEALTH CARE EDUCATION/TRAINING PROGRAM

## 2024-07-15 PROCEDURE — 99204 OFFICE O/P NEW MOD 45 MIN: CPT | Performed by: STUDENT IN AN ORGANIZED HEALTH CARE EDUCATION/TRAINING PROGRAM

## 2024-07-15 PROCEDURE — 3074F SYST BP LT 130 MM HG: CPT | Performed by: STUDENT IN AN ORGANIZED HEALTH CARE EDUCATION/TRAINING PROGRAM

## 2024-07-15 ASSESSMENT — PAIN SCALES - GENERAL: PAINLEVEL: 3

## 2024-07-15 NOTE — PROGRESS NOTES
New pt here with complaints of bladder prolapse.   PVR= 30ml     Chaperone declined: Ally Celis, CM3          Referred by: Dr. Jackson     PCP  Deyvi Jackson DO         CHIEF COMPLAINT:  vaginal bulge          HISTORY OF PRESENT ILLNESS:  This is a  59 y.o. y.o. female who presents with vaginal bulge. First noted years ago, now more predominant for last 1-2 months. Harder to reduce because it comes down right away.     Of note, has had menopause symptoms x 11 years, terrible hot flashes. Has brought up with GYNs but not really offered options for management.     The following were reviewed to gain additional history:  External notes: Dr. Jackson note, bladder prolapse, completed PFPT, h/o hernia repair with mesh  Test results: Cr 0.89 6/12/24         Specifically, she describes the following pelvic floor symptoms:          Prolapse: Yes       - Splinting to urinate: No       - Splinting for bowel movement/stool trapping: No              Incontinence:  No             Urge small volume about 1x/week               Urinary Symptoms:       - Frequency:  No             # Voids:  3-4x       - Nocturia: Yes             # Voids:  2x        - Urgency:  Yes       - Incomplete emptying: No       - Hesitancy:  Yes - has to push abdominally        - Pain with voiding:  Yes - some suprapubic pain with fullness resolved with void        - Excessive fluid intake: No - drinks 48oz of water                History:       - Recurrent UTI:  No       - Hematuria:  No       - Stones:  Yes - resolved        - Kidney Disease:  No                  Bowel Symptoms:       - Regular: Yes       - Diarrhea:Yes sometimes       - Constipation: No       - Fecal Incontinence:  No       - Flatus Incontinence:  No       - Fecal urgency:   Yes    Past medical and surgical hx reviewed - pertinent for   Ventral hernia repair with mesh  Nataliia, hemicolectomy    Smoking history: quit 30y ago         Gyn History:  - Menopausal: Yes            "Postmenopausal bleeding: Yes - x1 resolved s/p hysteroscopy   - HRT: No  - Pap up to date: Yes, upcoming appt in October    History of abnormal pap: Yes - LGSIL per chart review  - Sexually active:  Yes  Dyspareunia: Yes   Other issues: vaginal dryness   - Number of prior vaginal deliveries: 2   Number of prior operative deliveries: 0   Prior OASI? Denies but had 1 episiotomy   - Number of prior c-sections: 0    - Mammogram up to date: Yes  - Colonoscopy up to date: Yes    OB History          2    Para   2    Term   2            AB        Living   2         SAB        IAB        Ectopic        Multiple        Live Births                           PHYSICAL EXAMINATION:  No LMP recorded. Patient is postmenopausal.  Body mass index is 32.07 kg/m².  /80   Ht 1.74 m (5' 8.5\")   Wt 97.1 kg (214 lb)   BMI 32.07 kg/m²   General Appearance: well appearing  Neuro: Alert and oriented   HEENT: mucous membranes moist, neck supple  Resp: No respiratory distress, normal work of breathing  MSK: normal range of motion, gait appropriate    Pelvic:  Genitourinary: normal external genitalia, Bartholin's glands negative, [place]'s glands negative  Urethra: normal meatus, non-tender, no periurethral mass  Vaginal mucosa  normal  Cervix  normal  Uterus  normal size, non-tender, mobile  Adnexae  negative nontender, no masses  Atrophy negative    CST negative    POP-Q (in supine position):       Aa 0     Ba 0     C -5              gh 5     pb 2     tvl 9              Ap 0     Bp 0     D -6    Rectal: no hemorrhoids, fissures or masses    PVR (by Ultrasound): 30 ml         IMPRESSION AND PLAN:  Iris Fraire is a 59 y.o. who presents with stage 2 anterior predominant prolapse, ELIJAH.    POP  - reviewed risk factors, natural history and etiology of prolapse  - discussed management options for prolapse including expectant management, PFPT, pessary fitting, and surgery  - interested in trying pessary, will schedule with " Radha Chavez for fitting  - may consider surgery in the future, reviewed if she opts for surgery would likely try uterine sparing surgery given significant intraabdominal surgical history including umbilical mesh  - likely would plan for SSLF hysteropexy, A/P repair, possible sling  - will need to follow up on pap smear this fall to ensure normal (if persistent cervical dysplasia may favor hysterectomy with understanding of higher risk of bladder/bowel injury in that setting)    ELIJAH  - discussed etiology of ELIJAH and potential risk factors  - reviewed management strategies including PFPT, anti-incontinence ring, urethral bulking injections and midurethral sling placement  - pessary fitting as above    Menopausal symptoms  - referred to Dr. Drake for options counseling      All questions and concerns were answered and addressed.  The patient expressed understanding and agrees with the plan.     7/15/2024     RTC for pessary fitting with Scott Singh MD

## 2024-07-22 ENCOUNTER — APPOINTMENT (OUTPATIENT)
Dept: OBSTETRICS AND GYNECOLOGY | Facility: CLINIC | Age: 59
End: 2024-07-22
Payer: COMMERCIAL

## 2024-07-22 VITALS — WEIGHT: 216 LBS | DIASTOLIC BLOOD PRESSURE: 80 MMHG | SYSTOLIC BLOOD PRESSURE: 120 MMHG | BODY MASS INDEX: 32.36 KG/M2

## 2024-07-22 DIAGNOSIS — N81.10 FEMALE BLADDER PROLAPSE: Primary | ICD-10-CM

## 2024-07-22 DIAGNOSIS — Z46.89 FITTING AND ADJUSTMENT OF PESSARY: ICD-10-CM

## 2024-07-22 DIAGNOSIS — N95.2 VAGINAL ATROPHY: ICD-10-CM

## 2024-07-22 PROCEDURE — 99213 OFFICE O/P EST LOW 20 MIN: CPT | Performed by: NURSE PRACTITIONER

## 2024-07-22 RX ORDER — ESTRADIOL 0.1 MG/G
CREAM VAGINAL
Qty: 42.5 G | Refills: 3 | Status: SHIPPED | OUTPATIENT
Start: 2024-07-22

## 2024-07-22 ASSESSMENT — PAIN SCALES - GENERAL: PAINLEVEL: 2

## 2024-07-22 NOTE — PROGRESS NOTES
Pessary Fitting     This is a 59 y.o. referred by Dr. Singh for a pessary fitting for ELIJAH and stage 2 anterior predominant prolapse.       History: Ventral hernia repair with mesh, Nataliia, hemicolectomy, s/p hysteroscopy for PMB     Today she reports:  Gyn History:   - Endorses pressure and feeling like her POP is being pinched when she sits on a hard surface.   - Desires pessary fitting because she wants her POP treated in a less invasive way than surgery.   - She is sexually active.     Urinary Symptoms:   - Leaks with sneezing.   - After pt was sitting on hard bleachers, she stood up and felt like she was going to leak urine so she went to the toilet. When she was sitting, she felt pressure from the bleachers.     Bowel Symptoms:   - BMs are soft, she generally has diarrhea.     Exam:  Physical Exam  Constitutional:       Appearance: Normal appearance. She is normal weight.   Genitourinary:   POP-Q measurements were:      Aa: Ba: C:      gH: 5, pB: TVL:      Ap: Bp: D:   Pulmonary:      Effort: Pulmonary effort is normal.   Neurological:      Mental Status: She is alert.   Psychiatric:         Mood and Affect: Mood normal.         Behavior: Behavior normal.         Thought Content: Thought content normal.         Judgment: Judgment normal.          Procedure:   Patient ID: Iris Fraire is a 59 y.o. female.    Pessary    Date/Time: 7/22/2024 12:17 PM    Performed by: CARMINE Rabago  Authorized by: CARMINE Rabago    Consent:     Consent given by:  Patient  Indication:     Indication for pessary: cystocele    Procedure:     Pessaries tried:  #6 ring with knob pessary was too large and was painful to insert. #5 ring with knob pessary was uncomfortable and pt felt this pessary in place.      Assessment/Plan:  Iris Fraire is a 59 y.o. being treated for stage 2 anterior predominant prolapse, vulvovaginal atrophy, and ELIJAH.       Plan:   1. Stage 2 anterior predominant prolapse, ELIJAH   -  Tried to fit with #6 ring with knob pessary, but it was too large and painful to insert. #5 ring with knob pessary was uncomfortable and pt felt this pessary in place. We discussed trying another size that pt would remove nightly (cube), but patient declined interest because of the frequent maintenance  - She is not a candidate for a [facility] because she is sexually active  - We discussed treating prolapse with pessary use or surgery. If patient desired surgery, Dr. Singh was planning the SSLF hysteropexy, A/P repair, possible sling however if her pap smear this fall was abnormal and pt had persistent cervical dysplasia this may favor hysterectomy with understanding of higher risk of bladder/bowel injury in that setting. She has significant intraabdominal surgical history including umbilical mesh.   - She was given information to read on the [facility] surgery.     2. Vulvovaginal atrophy   - Severe atrophy might be contributing to the pinching sensation she is feeling while seated  - She can use Aquaphor or Vaseline PRN on the vulva.   - Start estrogen cream transvaginally. Nightly x2 weeks, then twice weekly at night. We reassured her that using tv estrogen cream has a localized effect to the vaginal tissue and is not a form of HRT such as po estrogen, which produces a systemic effect.     3. VMS, Health maintenance:  - She has an appointment with Dr. Drake on 9/18/24 for a pap and discussion of menopausal sxs.     Follow up in 2 months for a virtual visit with Dr. Singh to rediscuss surgical options/plan. She is undecided on if she wants to pursue surgery at this time.     All questions and concerns were answered and addressed.    Scribe Attestation:   IGia, am scribing for virtually, and in the presence of CARMINE Rabago on 7/22/24 at 12:15 PM.     I, Radha Chavez, personally performed the services described in the documentation as scribed in my presence and confirm it is both  complete and accurate.

## 2024-08-21 ENCOUNTER — APPOINTMENT (OUTPATIENT)
Dept: OTOLARYNGOLOGY | Facility: CLINIC | Age: 59
End: 2024-08-21
Payer: COMMERCIAL

## 2024-08-21 VITALS
OXYGEN SATURATION: 94 % | WEIGHT: 212.2 LBS | TEMPERATURE: 97.9 F | HEART RATE: 73 BPM | SYSTOLIC BLOOD PRESSURE: 115 MMHG | BODY MASS INDEX: 31.8 KG/M2 | DIASTOLIC BLOOD PRESSURE: 74 MMHG

## 2024-08-21 DIAGNOSIS — J35.8 TONSIL STONE: ICD-10-CM

## 2024-08-21 DIAGNOSIS — H93.8X3 SENSATION OF PLUGGED EAR, BILATERAL: ICD-10-CM

## 2024-08-21 DIAGNOSIS — H61.23 BILATERAL IMPACTED CERUMEN: Primary | ICD-10-CM

## 2024-08-21 PROCEDURE — 3074F SYST BP LT 130 MM HG: CPT

## 2024-08-21 PROCEDURE — 69210 REMOVE IMPACTED EAR WAX UNI: CPT

## 2024-08-21 PROCEDURE — 3078F DIAST BP <80 MM HG: CPT

## 2024-08-21 PROCEDURE — 99203 OFFICE O/P NEW LOW 30 MIN: CPT

## 2024-08-21 NOTE — PROGRESS NOTES
Patient ID: Iris Fraire is a 59 y.o. female who presents for the evaluation of tonsil stones. They present as a referral from Dr. Deyvi Jackson (PCP).    PROVIDER IMPRESSIONS:  DIAGNOSES/PROBLEMS:  -Bilateral cerumen impaction  -Tonsilliths    ASSESSMENT:   Iris Fraire is a pleasant 59 y.o. female who presents with symptoms of recurrent tonsilliths, halitosis, and plugged sensation in both ears. Based on the clinical information provided, symptoms and clinical exam findings are consistent with bilateral cerumen impaction. Using appropriate instrumentation, impacted cerumen was successfully removed from the EAC on the right. Unfortunately, due to degree of impaction and patient discomfort the impacted cerumen in the left EAC was unable to be removed today. Reassurance provided to patient that otologic exam today revealed no evidence of acute infection/inflammation in the right and that right TM appears with no evidence of infection, effusion, retraction or perforation.   Oral exam today revealed symmetric 1.5+ tonsils that appeared slightly cryptic with no sign of tonsilliths, exudate, or erythema. I explained to patient that recommendations include prevention of tonsil stone formation.     PLAN:  Patient was advised against picking at stone(s) with toothpick, finger, q-tip or any sharp or pointed objects as these can traumatize the tonsil tissues and cause infection. I recommended brushing teeth at least three times a day. I recommended supportive care with salt/seltzer water gargle every after meal and at bedtime. I recommended patient uses a cool-mist humidifier in the bedroom to moisten the air during the dryer seasons.   I recommended instilling o.t.c. ceruminolytic agent (Debrox) into the left EAC 3 times per week and 3 consecutive days leading up to follow-up visit.   I counseled patient on safe interventions for cerumen management at home. Patient may wash the external ear with a cloth. I reinforced  "education to the patient that they should avoid using cotton tipped applicators, tissues, paper, or any rigid object in the ear canal. I explained to the patient that doing so may cause wax to be pushed back into the ear canal and stuck and also risks injury to the ear canal and ear drum.   Follow-up: Patient may schedule for follow-up in 2-3 weeks for subsequent attempt at removal of left cerumen impaction, sooner if needed. Patient is agreeable to this plan, all questions were answered to patient's satisfaction.     Subjective   HPI: Iris Fraire is a 59 y.o. female who presents for evaluation of recurrent tonsil stones that began many years ago. The patient reports noticing tonsil stones frequently forming on both tonsils, right greater than left. She states that tonsil stones more frequently form in the fall/winter seasons. She presents today because the last tonsil stone that formed on the right tonsil was large enough to make her choke briefly while sleeping a few weeks ago. She believes that she does not currently have any tonsil stones. Reports she typically uses her toothbrush to manipulate stones on the tonsils in an attempt to dislodge them. Reports associated symptoms of bad breathe. Denies the presence of symptoms including throat pain, cough, globus sensation, muffled/hoarse voice, drooling, dyspnea/SOB, difficulty swallowing, painful swallowing, fever, or rhinorrhea.   Patient rates current throat pain a 0/10 on numeric pain scale. Patient denies other symptoms of unintentional weight loss, night sweats, neck masses/swelling, or recent heartburn/reflux. She mentions that she has \"bad ears\" and reports feeling plugged sensation in the ears. When asked about any current/prior use of smoking or tobacco products, the patient admits to none. Patient denies a history of recurrent throat infections. Patient denies past medical history of asthma, thyroid conditions, seasonal allergies. Denies history of " laryngeal/neck conditions, trauma or surgeries. Other pertinent past medical history includes multinodular thyroid goiter.       PATIENT HISTORY:  No past medical history on file.   Past Surgical History:   Procedure Laterality Date    APPENDECTOMY  10/28/2015    Appendectomy    CHOLECYSTECTOMY  02/01/2016    Cholecystectomy Laparoscopic    OTHER SURGICAL HISTORY  10/27/2015    Incisional Hernia Repair    OTHER SURGICAL HISTORY  10/27/2015    Right Hemicolectomy      Allergies   Allergen Reactions    Hydrocortisone Other    Iodinated Contrast Media Hives    Prednisone Other        Current Outpatient Medications:     ergocalciferol (Vitamin D-2) 1.25 MG (74488 UT) capsule, Take 1 capsule (50,000 Units) by mouth every 30 (thirty) days., Disp: 12 capsule, Rfl: 3    estradiol (Estrace) 0.01 % (0.1 mg/gram) vaginal cream, Apply 1/2 gram into the vagina nightly x 2 weeks, then twice weekly at night., Disp: 42.5 g, Rfl: 3    famotidine (Pepcid) 20 mg tablet, Take 1 tablet (20 mg) by mouth once daily., Disp: 90 tablet, Rfl: 2    fluticasone (Flonase) 50 mcg/actuation nasal spray, SPRAY 2 SPRAYS INTO EACH NOSTRIL EVERY DAY, Disp: 48 mL, Rfl: 1    hyoscyamine (Anaspaz, Levsin) 0.125 mg tablet, Take 1 tablet (0.125 mg) by mouth 2 times a day as needed for cramping., Disp: 180 tablet, Rfl: 0    ibuprofen 600 mg tablet, Take 1 tablet (600 mg) by mouth every 6 hours if needed., Disp: , Rfl:     lisinopril 20 mg tablet, Take 1 tablet (20 mg) by mouth once daily., Disp: 90 tablet, Rfl: 3    meclizine (Antivert) 25 mg tablet, Take 1 tablet (25 mg) by mouth 3 times a day as needed for dizziness., Disp: 30 tablet, Rfl: 3    meloxicam (Mobic) 15 mg tablet, TAKE 1 TABLET BY MOUTH DAILY WITH FOOD AS NEEDED FOR PAIN, Disp: 90 tablet, Rfl: 3    methocarbamol (Robaxin) 500 mg tablet, Take 1 tablet (500 mg) by mouth 3 times a day., Disp: 30 tablet, Rfl: 1    omega-3 fatty acids-fish oil (Fish OiL) 300-500 mg capsule, Take by mouth., Disp: ,  Rfl:    Tobacco Use: Low Risk  (7/22/2024)    Patient History     Smoking Tobacco Use: Never     Smokeless Tobacco Use: Never     Passive Exposure: Not on file      Alcohol Use: Not on file      Social History     Substance and Sexual Activity   Drug Use Never        Review of Systems   All other systems negative.     Objective   Visit Vitals  OB Status Postmenopausal   Smoking Status Never        PHYSICAL EXAM:  General appearance: Appears well, well-nourished, well groomed. No acute distress.   Constitutional: No fever, chills, weight loss or weight gain.  Communication: Normal communication  Psychiatric: Oriented to person, place and time. Normal mood and affect.  Neurologic: Cranial nerves II-XII grossly intact and symmetric bilaterally.  Cardiovascular: Examination of peripheral vascular system shows no clubbing or cyanosis.  Respiratory: No respiratory distress increased work of breathing. Inspection of the chest with symmetric chest expansion and normal respiratory effort.  Skin: No head and neck rashes.  Head: Normocephalic. Atraumatic with no masses, lesions or scarring.  Face: Normal symmetry. No scars or deformities.  Eyes: Conjunctiva not edematous or erythematous. PERRLA  Neck: Supple and symmetric, trachea midline. Lymph nodes with no adenopathy.  Head: Normocephalic. Atraumatic with no masses, lesions or scarring.  Eyes: PERRL, EOMI, Conjunctiva is clear. No nystagmus.  Nose: External inspection of nose: No nasal lesions, lacerations or scars. No tenderness on frontal or maxillary sinus palpation.  Throat:  Floor of mouth is clear, no masses.  Tongue appears normal, no lesions or masses. Gums, gingiva, buccal mucosa appear pink and moist, no lesions. Teeth are in intact.  No obvious dental infections.  Peritonsillar regions appear symmetric without swelling. Hard and soft palate appear normal, no obvious cleft. Uvula is midline.  Left Tonsil -- 1.5+ and cryptic, no tonsilliths, no exudates.  Right  Tonsil -- 1.5+ and cryptic, no tonsilliths, no exudates.  Oropharynx: No lesions. Retropharyngeal wall is flat.  No postnasal drip.  Salivary Glands: Symmetric bilaterally.  No palpable masses.  No evidence of acute infection or salivary stones.  TMJ: Normal, no trismus.  Right Ear: External inspection of ear with no deformity, scars, or masses. Mastoid is nontender. External auditory canal is partially impacted with cerumen. Unable to visualize TM.   Left Ear: External inspection of ear with no deformity, scars, or masses. Mastoid is nontender. External auditory canal is completely impacted with cerumen. Unable to visualize TM.     EAR CLEANING PROCEDURE NOTE:  Indication: Cerumen impaction  Location: bilateral ear canals  Procedure Note: The procedure was performed by the provider.  Visualization Instrument: A microscope with a #5 speculum was placed in the ear canals to visualize the ear canal debris.  Ear Cleaning Instrument and Outcome: Using the alligator forceps, hook, 5/7/flynn suction, a large amount of flaky, pale cerumen was removed from the impacted EAC on the right.  Post-Procedure/Microscopic Otologic Exam:  Right Ear--TM is intact with no sign of infection, effusion, or retraction.  No perforation seen. EAC is clear. Auto insufflation visible under microscopy.  Left Ear-- EAC is completely impacted with cerumen. Unable to visualize TM.  Patient Status: The patient tolerated the procedure well.  Complications:  Due to degree of impaction and patient discomfort, impacted cerumen was unable to be removed from the left EAC today and procedure was aborted.        Marianna Yo, APRN-CNP

## 2024-09-12 ENCOUNTER — APPOINTMENT (OUTPATIENT)
Dept: OTOLARYNGOLOGY | Facility: CLINIC | Age: 59
End: 2024-09-12
Payer: COMMERCIAL

## 2024-09-18 ENCOUNTER — APPOINTMENT (OUTPATIENT)
Dept: OBSTETRICS AND GYNECOLOGY | Facility: CLINIC | Age: 59
End: 2024-09-18
Payer: COMMERCIAL

## 2024-09-24 DIAGNOSIS — K58.9 IRRITABLE BOWEL SYNDROME, UNSPECIFIED TYPE: ICD-10-CM

## 2024-09-24 RX ORDER — HYOSCYAMINE SULFATE 0.125 MG
0.12 TABLET ORAL 2 TIMES DAILY PRN
Qty: 180 TABLET | Refills: 0 | Status: SHIPPED | OUTPATIENT
Start: 2024-09-24

## 2024-10-08 ENCOUNTER — APPOINTMENT (OUTPATIENT)
Dept: OTOLARYNGOLOGY | Facility: CLINIC | Age: 59
End: 2024-10-08
Payer: COMMERCIAL

## 2024-10-08 VITALS — BODY MASS INDEX: 31.8 KG/M2 | HEIGHT: 69 IN | SYSTOLIC BLOOD PRESSURE: 164 MMHG | DIASTOLIC BLOOD PRESSURE: 77 MMHG

## 2024-10-08 DIAGNOSIS — H61.22 IMPACTED CERUMEN OF LEFT EAR: Primary | ICD-10-CM

## 2024-10-08 DIAGNOSIS — H60.92 OTITIS EXTERNA OF LEFT EAR, UNSPECIFIED CHRONICITY, UNSPECIFIED TYPE: ICD-10-CM

## 2024-10-08 PROCEDURE — 99214 OFFICE O/P EST MOD 30 MIN: CPT

## 2024-10-08 PROCEDURE — 1036F TOBACCO NON-USER: CPT

## 2024-10-08 PROCEDURE — 92504 EAR MICROSCOPY EXAMINATION: CPT

## 2024-10-08 PROCEDURE — 3078F DIAST BP <80 MM HG: CPT

## 2024-10-08 PROCEDURE — 3077F SYST BP >= 140 MM HG: CPT

## 2024-10-08 RX ORDER — OFLOXACIN 3 MG/ML
4 SOLUTION AURICULAR (OTIC) 2 TIMES DAILY
Qty: 0.28 ML | Refills: 0 | Status: SHIPPED | OUTPATIENT
Start: 2024-10-08 | End: 2024-10-15

## 2024-10-08 NOTE — PROGRESS NOTES
Patient ID: Iris Fraire is a 59 y.o. female who presents for a follow up assessment for left earwax removal.     PROVIDER IMPRESSIONS:  DIAGNOSES/PROBLEMS:  - Cerumen impaction of the left ear   - Left otitis externa     ASSESSMENT: Iris Fraire is a 59 y.o. female who presents for a follow up encounter with symptoms and clinical findings that are consistent with left cerumen impaction. Using appropriate instrumentation, I attempted to remove impacted cerumen for the second time. Unfortunately, due to degree of impaction and patient discomfort the impacted cerumen remains in the left EAC was unable to be removed today. Left EAC appears erythematous/inflamed after lifting adherent/firm cerumen away from the posterior aspect of the left EAC and may be consistent with findings of keratosis obturans. Reassurance provided to patient that otologic exam today under microscopy revealed no evidence of acute infection/inflammation in the right EAC and that right TM appears with no evidence of infection, effusion, retraction or perforation.  Reassurance provided to patient that otologic exam today revealed no evidence of acute infection/inflammation in the EAC bilaterally and that TM appears with no evidence of infection, effusion, retraction or perforation bilaterally.      PLAN:   -I counseled patient on safe interventions for cerumen management at home. Patient may wash the external ear with a cloth. I reinforced education to the patient that they should avoid using cotton tipped applicators, tissues, paper, or any rigid object in the ear canal. I explained to the patient that doing so may cause wax to be pushed back into the ear canal and stuck and also risks injury to the ear canal and ear drum.   -I recommended course of ofloxacin otic drops with 4 drops into the left EAC BID x 7 days for left OE/inflammation secondary to manipulation of adherent cerumen. Patient was counseled on the indications, possible side  effects, and proper administration of medication. Prescription was e-submitted to the patient's preferred pharmacy.   -Patient advised to discontinue use of Debrox ceruminolytic agent during the 7 day course of ofloxacin antibiotic otic drops. After course completion, I recommended patient resumes use of o.t.c. ceruminolytic agent (Debrox) with 5-10 drops into the left EAC 3 times per week and 3 consecutive days leading up to follow-up visit. Prescription refill request for medication was declined.  -Follow-up: Patient may schedule for subsequent attempt at removal of cerumen impaction in 2-3 weeks with my ENT physician partner Dr. Joyce Mar. They may follow up with me sooner if needed. All questions answered to patient satisfaction.    At today's visit with Iris Fraire, the number of minutes I spent providing patient care was 30. More than 50% of that time was spent counseling the patient on possible etiologies, test results, treatment options and care coordination.     Subjective    HPI: Iris Fraire is a 59 y.o. female who presents for 7 week follow-up evaluation for second attempt of left earwax removal. Since last visit on 8/21/24, patient reports she has been consistent with use of o.t.c. Debrox drops and has noticed left ear crackling/popping with use of drops. Mentions she has a longstanding history of left ear issues including decreased hearing in the left ear for many years. States she has a history of ear sensitivity and anxiety due to clinician ear cleanings. Patient reports history of drug allergy to oral steroids with adverse reaction of hives/rash.    RECALL 8/21/24:  HPI: Iris Fraire is a 59 y.o. female who presents for evaluation of recurrent tonsil stones that began many years ago. The patient reports noticing tonsil stones frequently forming on both tonsils, right greater than left. She states that tonsil stones more frequently form in the fall/winter seasons. She presents today because the  "last tonsil stone that formed on the right tonsil was large enough to make her choke briefly while sleeping a few weeks ago. She believes that she does not currently have any tonsil stones. Reports she typically uses her toothbrush to manipulate stones on the tonsils in an attempt to dislodge them. Reports associated symptoms of bad breathe. Denies the presence of symptoms including throat pain, cough, globus sensation, muffled/hoarse voice, drooling, dyspnea/SOB, difficulty swallowing, painful swallowing, fever, or rhinorrhea. Patient rates current throat pain a 0/10 on numeric pain scale. Patient denies other symptoms of unintentional weight loss, night sweats, neck masses/swelling, or recent heartburn/reflux. She mentions that she has \"bad ears\" and reports feeling plugged sensation in the ears. When asked about any current/prior use of smoking or tobacco products, the patient admits to none. Patient denies a history of recurrent throat infections. Patient denies past medical history of asthma, thyroid conditions, seasonal allergies. Denies history of laryngeal/neck conditions, trauma or surgeries. Other pertinent past medical history includes multinodular thyroid goiter.   ASSESSMENT:   Iris Fraire is a pleasant 59 y.o. female who presents with symptoms of recurrent tonsilliths, halitosis, and plugged sensation in both ears. Based on the clinical information provided, symptoms and clinical exam findings are consistent with bilateral cerumen impaction. Using appropriate instrumentation, impacted cerumen was successfully removed from the EAC on the right. Unfortunately, due to degree of impaction and patient discomfort the impacted cerumen in the left EAC was unable to be removed today. Reassurance provided to patient that otologic exam today revealed no evidence of acute infection/inflammation in the right and that right TM appears with no evidence of infection, effusion, retraction or perforation.   Oral exam " today revealed symmetric 1.5+ tonsils that appeared slightly cryptic with no sign of tonsilliths, exudate, or erythema. I explained to patient that recommendations include prevention of tonsil stone formation.   PLAN:  Patient was advised against picking at stone(s) with toothpick, finger, q-tip or any sharp or pointed objects as these can traumatize the tonsil tissues and cause infection. I recommended brushing teeth at least three times a day. I recommended supportive care with salt/seltzer water gargle every after meal and at bedtime. I recommended patient uses a cool-mist humidifier in the bedroom to moisten the air during the dryer seasons.   I recommended instilling o.t.c. ceruminolytic agent (Debrox) into the left EAC 3 times per week and 3 consecutive days leading up to follow-up visit.   I counseled patient on safe interventions for cerumen management at home. Patient may wash the external ear with a cloth. I reinforced education to the patient that they should avoid using cotton tipped applicators, tissues, paper, or any rigid object in the ear canal. I explained to the patient that doing so may cause wax to be pushed back into the ear canal and stuck and also risks injury to the ear canal and ear drum.   Follow-up: Patient may schedule for follow-up in 2-3 weeks for subsequent attempt at removal of left cerumen impaction, sooner if needed. Patient is agreeable to this plan, all questions were answered to patient's satisfaction.     REVIEW OF SYSTEMS:  All other systems negative.    Objective   ENT focused Physical Exam:  Right Ear: External inspection of ear with no deformity, scars, or masses. EAC is clear. TM is intact with no sign of infection, effusion, or retraction. No perforation seen.   Left Ear: External inspection of ear with no deformity, scars, or masses. EAC is completely impacted with cerumen. Unable to visualize tympanic membrane.     EAR CLEANING PROCEDURE NOTE:  Indication: Cerumen  impaction  Location: left ear canal  Procedure Note: The procedure was performed by the provider.  Visualization Instrument: A microscope with a #5 speculum was placed in the ear canals to visualize the ear canal debris.  Ear Cleaning Instrument and Outcome: Using the alligator forcepsmelissa, a  large amount of firm, brown cerumen was partially removed from the left EAC. Due to degree of impaction and patient discomfort, impacted cerumen was unable to be removed from the left EAC today and procedure was aborted.   Patient Status: The patient did not tolerate the procedure well and experienced significant discomfort/anxiety with attempted removal of cerumen.  Complications: No significant complications.  After cleaning:   LEFT EAR: Left TM unable to be visualized. Left EAC remains impacted with dry/brown cerumen and appears erythematous/inflamed on posterior aspect of the canal wall secondary to lifting/peeling dry adherent cerumen off of skin. No sign of EAC bleeding, purulence, or ulceration.

## 2024-10-12 ENCOUNTER — TELEPHONE (OUTPATIENT)
Dept: PRIMARY CARE | Facility: CLINIC | Age: 59
End: 2024-10-12
Payer: COMMERCIAL

## 2024-10-12 DIAGNOSIS — U07.1 COVID-19: Primary | ICD-10-CM

## 2024-10-14 ENCOUNTER — APPOINTMENT (OUTPATIENT)
Dept: OTOLARYNGOLOGY | Facility: CLINIC | Age: 59
End: 2024-10-14
Payer: COMMERCIAL

## 2024-10-14 DIAGNOSIS — J30.9 ALLERGIC RHINITIS, UNSPECIFIED: ICD-10-CM

## 2024-10-14 RX ORDER — FLUTICASONE PROPIONATE 50 MCG
2 SPRAY, SUSPENSION (ML) NASAL DAILY
Qty: 48 ML | Refills: 1 | Status: SHIPPED | OUTPATIENT
Start: 2024-10-14

## 2024-10-21 ENCOUNTER — APPOINTMENT (OUTPATIENT)
Dept: OTOLARYNGOLOGY | Facility: CLINIC | Age: 59
End: 2024-10-21
Payer: COMMERCIAL

## 2024-10-21 VITALS
DIASTOLIC BLOOD PRESSURE: 76 MMHG | HEIGHT: 69 IN | WEIGHT: 212 LBS | BODY MASS INDEX: 31.4 KG/M2 | SYSTOLIC BLOOD PRESSURE: 125 MMHG

## 2024-10-21 DIAGNOSIS — H61.22 IMPACTED CERUMEN OF LEFT EAR: Primary | ICD-10-CM

## 2024-10-21 PROCEDURE — 3074F SYST BP LT 130 MM HG: CPT | Performed by: STUDENT IN AN ORGANIZED HEALTH CARE EDUCATION/TRAINING PROGRAM

## 2024-10-21 PROCEDURE — 3078F DIAST BP <80 MM HG: CPT | Performed by: STUDENT IN AN ORGANIZED HEALTH CARE EDUCATION/TRAINING PROGRAM

## 2024-10-21 PROCEDURE — 69210 REMOVE IMPACTED EAR WAX UNI: CPT | Performed by: STUDENT IN AN ORGANIZED HEALTH CARE EDUCATION/TRAINING PROGRAM

## 2024-10-21 PROCEDURE — 3008F BODY MASS INDEX DOCD: CPT | Performed by: STUDENT IN AN ORGANIZED HEALTH CARE EDUCATION/TRAINING PROGRAM

## 2024-10-21 PROCEDURE — 1036F TOBACCO NON-USER: CPT | Performed by: STUDENT IN AN ORGANIZED HEALTH CARE EDUCATION/TRAINING PROGRAM

## 2024-10-21 NOTE — PROGRESS NOTES
ENT Outpatient Consultation    Chief Complaint: cerumen impaction  History Of Present Illness  Iris Fraire is a 59 y.o. female presents for evaluation of left cerumen impaction and left otitis externa at the referral of Divine Yo NP. She has had significant impacted debris in the left ear that was unable to be removed at last visit on 10/8. She was instructed to use ear drops for OE x 7 days and return for repeat debridement. She has not had a recent hearing test.     Past Medical History  She has no past medical history on file.    Surgical History  She has a past surgical history that includes Other surgical history (10/27/2015); Other surgical history (10/27/2015); Cholecystectomy (02/01/2016); and Appendectomy (10/28/2015).     Social History  She reports that she has never smoked. She has never used smokeless tobacco. She reports current alcohol use. She reports that she does not use drugs.    Family History  Family History   Problem Relation Name Age of Onset    Diabetes Mother      Osteoporosis Mother      Colon cancer Maternal Grandmother      Thyroid disease Paternal Grandmother          Allergies  Hydrocortisone, Iodinated contrast media, and Prednisone     Physical Exam:  CONSTITUTIONAL:  No acute distress  VOICE:  No hoarseness or other abnormality  RESPIRATION:  Breathing comfortably, no stridor  EYES:  EOM intact, sclera normal  NEURO:  Alert and oriented times 3, Cranial nerves II-XII grossly intact and symmetric bilaterally  HEAD AND FACE:  Symmetric facial features, no masses or lesions, sinuses non-tender to palpation  EARS:  Normal external ears, external auditory canals, and TMs to otoscopy, normal hearing to whispered voice.  NOSE:  External nose midline  ORAL CAVITY/OROPHARYNX/LIPS:  Normal mucous membranes, normal floor of mouth/tongue/OP, no masses or lesions, 2+ cryptic tonsils with tonsillolith on left  PHARYNGEAL WALLS:  No masses or lesions  NECK/LYMPH:  No LAD, no thyroid masses,  "trachea midline  SKIN:  Neck skin is without scar or injury  PSYCH:  Alert and oriented with appropriate mood and affect    Procedure: Otomicroscopy with removal of deeply impacted cerumen  Right:  EAC patent and normal, TM intact without effusion or retraction, middle ear space clear. Possibly very small pinpoint perforation along posterior TM margin.  Left: Deeply impacted cerumen and occluding removed with curette and suction.  EAC patent and normal with exception of mild erythema of medial canal abutting TM, TM intact without effusion or retraction, middle ear space clear       Last Recorded Vitals  Blood pressure 125/76, height 1.74 m (5' 8.5\"), weight 96.2 kg (212 lb).    Relevant Results  N/a    Assessment and Plan  59 y.o. female with left cerumen impaction which was removed with curette and suction under otomicroscopy today. Discussed management of cerumen moving forward and advised her to use debrox every few weeks to keep cerumen moist. Use ofloxacin drops for 3 more days in the left ear for medial EAC irritation. RTC 4-6 months with Divine Yo NP with an audiogram to check on cerumen burden as well as right pinpoint TM perforation.    Problem List Items Addressed This Visit    None  Visit Diagnoses       Impacted cerumen of left ear    -  Primary            Joyce Mar MD  "

## 2024-12-29 DIAGNOSIS — K21.9 GASTROESOPHAGEAL REFLUX DISEASE WITHOUT ESOPHAGITIS: ICD-10-CM

## 2024-12-30 RX ORDER — FAMOTIDINE 20 MG/1
20 TABLET, FILM COATED ORAL DAILY
Qty: 90 TABLET | Refills: 2 | Status: SHIPPED | OUTPATIENT
Start: 2024-12-30

## 2025-02-17 ENCOUNTER — APPOINTMENT (OUTPATIENT)
Dept: OTOLARYNGOLOGY | Facility: CLINIC | Age: 60
End: 2025-02-17
Payer: COMMERCIAL

## 2025-02-17 ENCOUNTER — APPOINTMENT (OUTPATIENT)
Dept: AUDIOLOGY | Facility: CLINIC | Age: 60
End: 2025-02-17
Payer: COMMERCIAL

## 2025-02-17 VITALS
WEIGHT: 212 LBS | HEIGHT: 69 IN | DIASTOLIC BLOOD PRESSURE: 84 MMHG | BODY MASS INDEX: 31.4 KG/M2 | SYSTOLIC BLOOD PRESSURE: 144 MMHG

## 2025-02-17 DIAGNOSIS — H90.A21 SENSORINEURAL HEARING LOSS (SNHL) OF RIGHT EAR WITH RESTRICTED HEARING OF LEFT EAR: ICD-10-CM

## 2025-02-17 DIAGNOSIS — H61.23 BILATERAL IMPACTED CERUMEN: ICD-10-CM

## 2025-02-17 DIAGNOSIS — H90.A32 MIXED CONDUCTIVE AND SENSORINEURAL HEARING LOSS OF LEFT EAR WITH RESTRICTED HEARING OF RIGHT EAR: Primary | ICD-10-CM

## 2025-02-17 DIAGNOSIS — H90.72 MIXED CONDUCTIVE AND SENSORINEURAL HEARING LOSS OF LEFT EAR WITH UNRESTRICTED HEARING OF RIGHT EAR: Primary | ICD-10-CM

## 2025-02-17 PROCEDURE — 3077F SYST BP >= 140 MM HG: CPT

## 2025-02-17 PROCEDURE — 92550 TYMPANOMETRY & REFLEX THRESH: CPT | Mod: 52 | Performed by: AUDIOLOGIST

## 2025-02-17 PROCEDURE — 99214 OFFICE O/P EST MOD 30 MIN: CPT

## 2025-02-17 PROCEDURE — 69210 REMOVE IMPACTED EAR WAX UNI: CPT

## 2025-02-17 PROCEDURE — 3079F DIAST BP 80-89 MM HG: CPT

## 2025-02-17 PROCEDURE — 1036F TOBACCO NON-USER: CPT

## 2025-02-17 PROCEDURE — 3008F BODY MASS INDEX DOCD: CPT

## 2025-02-17 PROCEDURE — 92557 COMPREHENSIVE HEARING TEST: CPT | Performed by: AUDIOLOGIST

## 2025-02-17 ASSESSMENT — ENCOUNTER SYMPTOMS: OCCASIONAL FEELINGS OF UNSTEADINESS: 0

## 2025-02-17 ASSESSMENT — PAIN - FUNCTIONAL ASSESSMENT: PAIN_FUNCTIONAL_ASSESSMENT: 0-10

## 2025-02-17 ASSESSMENT — PAIN SCALES - GENERAL: PAINLEVEL_OUTOF10: 0 - NO PAIN

## 2025-02-17 NOTE — PROGRESS NOTES
Patient ID: Iris Fraire is a 59 y.o. female who presents for a follow up assessment of earwax removal.     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 female who presents for a follow up encounter with symptoms and clinical findings that are consistent with bilateral cerumen impaction, mixed conductive and sensorineural hearing loss in the right ear, and sensorineural hearing loss in the left ear. Using appropriate instrumentation, impacted cerumen was successfully removed from the affected EAC(s). Exam today revealed bilateral TM retraction with central bulging/whitish appearance of the left TM concerning for left middle ear cholesteatoma. Reassurance provided to patient that otologic exam today revealed no evidence of acute infection/inflammation in the EAC bilaterally and that TM appears with no evidence of infection, effusion, or perforation bilaterally.  Audiogram reviewed in detail with the patient today, which revealed moderate to mild mixed conductive and sensorineural hearing loss in the left ear, and mild to moderate high-frequency sensorineural hearing loss in the right ear.     PLAN:   -I recommended CT IAC wo contrast for further evaluation for left middle ear cholesteatoma. Order e-submitted and patient instructed to contact  radiology to coordinate imaging. Patient informed that results will be reviewed at virtual follow-up visit.  -I counseled patient on safe interventions for cerumen management at home. Patient may wash the external ear with a cloth. I reinforced education to the patient that they should avoid using cotton tipped applicators, tissues, paper, or any rigid object in the ear canal. I explained to the patient that doing so may cause wax to  be pushed back into the ear canal and stuck and also risks injury to the ear canal and ear drum.   -Follow-up: Patient may follow-up virtually after obtaining CT IAC to review the results. Patient may schedule for routine evaluation for the removal of cerumen impaction in 6 months. They may follow up with me as requested, sooner if needed. All questions answered to patient satisfaction.    At today's visit with Iris Fraire, the number of minutes I spent providing patient care was 30. More than 50% of that time was spent counseling the patient on possible etiologies, test results, treatment options and care coordination.     Subjective    HPI: Iris Fraire is a 59 y.o. female who presents for routine 4 month follow-up for earwax removal. Patient last seen by my ENT partner Dr. Joyce Mar on 10/21/24 for cerumen impaction removal. Since last visit, patient reports that she has noticed mild intermittent left otalgia which seems to improve with use of o.t.c. debrox. She denies any current ear pain. Reports that she continues to notice bilateral hearing difficulty, left ear greater than right. Denies any recent hearing loss progression or fluctuations. Denies any significant hearing difficulty with daily communication. She does note her history of recurrent ear infections in childhood with PET insertion in childhood. When asked about the presence of associated otologic symptoms, including tinnitus, ear drainage, ear itching, aural fullness, autophony, dizziness or vertigo, the patient admits to none. She has been using o.t,c. Debrox ear drops to loosen wax immediately prior to this visit.     REVIEW OF SYSTEMS:  All other systems negative.    Objective   Physical Exam:  Right Ear: External inspection of ear with no deformity, scars, or masses. EAC is partially impacted with cerumen. Unable to visualize tympanic membrane.  Left Ear: External inspection of ear with no deformity, scars, or masses. EAC is partially  impacted with cerumen. Unable to visualize tympanic membrane.     EAR CLEANING PROCEDURE NOTE:  Indication: Cerumen impaction  Location: bilateral ear canals  Procedure Note: The procedure was performed by the provider.  Visualization Instrument: A microscope with a #5 speculum was placed in the ear canals to visualize the ear canal debris.  Ear Cleaning Instrument and Outcome: Using the alligator forceps and 7 suction, a moderate amount of dry, brown cerumen was removed from the impacted EAC(s).  Patient Status: The patient tolerated the procedure well.  Complications: There were no complications.    Post-Procedure/Microscopic Otologic Exam:   Right Ear--EAC is clear. TM is intact with mild retraction but no sign of infection, effusion, or perforation seen. Auto insufflation not visible under microscopy.  Left Ear-- EAC is clear. TM is intact with moderate retraction and central bulging/whitish thickened appearance. TM with no sign of infection, effusion, or perforation seen. Auto insufflation not visible under microscopy.  TUNING FORK EXAM (512/1024Hz) forks):  --Guevara: Midline, does not lateralize  --Rinne: AC>BC bilaterally    RESULTS:  -I personally reviewed the patient's audiogram from 02/17/25, which revealed the following results: Right ear with normal hearing through 6000 Hz, sloping to mild to moderate sensorineural hearing loss above. Left ear with moderate conductive hearing loss through 250 Hz, rising to normal hearing through 2000 Hz, sloping to a mild conductive hearing loss notched at 3000 Hz, rising to normal hearing at 4000 Hz, sloping to a mild sensorineural hearing loss above. Right ear with Type B tympanogram, and left ear with normal tympanogram. Right ear with 100% WRS at 50 dB, and left ear with 100% WRS at 65 dB. Acoustic reflexes present right ipsilateral, and absent (except present at 500 Hz) left ipsilateral. Negative Margaret noted at 250 Hz.

## 2025-02-17 NOTE — PROGRESS NOTES
AUDIOLOGY ADULT AUDIOMETRIC EVALUATION      Name:  Iris Fraire  :  1965  Age:  59 y.o.  Date of Evaluation: 25    History:  Reason for visit:  Ms. Iris Fraire was seen today as part of the visit with CARMINE Louis for an evaluation of hearing.   Chief Complaint   Patient presents with    Ear Fullness     The patient stated she was recently seen for concerns for otalgia and ear fullness in the left ear. Stated cerumen removal was performed, along with a prescription for antibiotics, due to some concern for a possible infection. Mentioned there was some concern for the appearance of the left tympanic membrane and hearing testing was recommended.   Stated she has a history of hearing loss in the left ear due to current ear infections and PE Tubes as a child. She has not noticed any difficulty hearing or communicating at this time.   Denied any current otalgia, aural fullness, chronic tinnitus, ear pressure, dizziness/vertigo, recent falls, significant noise exposure, sinus/throat concerns, ear drainage, or sudden hearing loss.    EVALUATION     See Audiogram    RESULTS:    Otoscopic Evaluation:   Right Ear: Otoscopy revealed a clear healthy canal and a healthy tympanic membrane was visualized.   Left Ear: Otoscopy revealed a clear healthy canal and a healthy tympanic membrane was visualized. Note the incus appeared very prominent in appearance.     Immittance:  Immittance Measures: 226 Hz   Right Ear: Tympanometric testing revealed a type B flat tympanogram with no measurable middle ear pressure or static compliance and normal ear canal volume. Note, results should be interpreted with caution as the ear canal was difficult to seal.   Left Ear: Tympanometric testing revealed a normal type A tympanogram with normal middle ear pressure and normal static compliance.    Right Ear: Ipsilateral acoustic reflexes were present at, 500-2,000 Hz, at expected sensation levels and absent at 4,000  Hz.  Left Ear: Ipsilateral acoustic reflexes were present at, 500 Hz, at an expected sensation levels and absent at 1,000-4,000 Hz.    Test technique:  Pure Tone Audiometry via insert earphones    Reliability:   excellent    Pure Tone Audiometry:    Right Ear: Audiometric testing indicated a mild low frequency hearing loss at 125 Hz, rising to normal peripheral hearing sensitivity through 6,000 Hz, sloping to a mild high frequency sensorineural hearing loss above.   Left Ear:   Audiometric testing indicated a moderate conductive hearing loss through 250 Hz, rising to near normal to normal peripheral hearing sensitivity through 2,000 Hz, sloping to a mild conductive hearing loss at 3,000 Hz, sloping to a mild sensorineural hearing loss above.   Note conductive components in the left e ar.       Speech Audiometry:   Right Ear:  Speech Reception Threshold (SRT) was obtained at 10 dBHL                  Word Recognition scores were excellent (100%) in quiet when words were presented at 50 dBHL  Left Ear:  Speech Reception Threshold (SRT) was obtained at  25 dBHL                  Word Recognition scores were excellent (100%) in quiet when words were presented at 65 dBHL  Testing was performed with recorded NU-6 speech words in quiet. Speech thresholds were in good agreement with the pure tone averages in each ear.     IMPRESSIONS:  Today's test results are hearing loss requiring medical/otologic follow-up.  The patient was counseled with regard to the findings.    RECOMMENDATIONS:  * Continue medical follow up with CARMINE Louis.  * Retest as medically indicated, or sooner if a change in hearing sensitivity is noticed.   * Wear hearing protection while in the presence of loud sounds.   * Use effective communication strategies such as asking the speaker to gain attention prior to speaking, speaking in the same room, repeating words that were heard, etc.    PATIENT EDUCATION:   Discussed results and  recommendations with the patient and a copy of the hearing test was provided.  Questions were addressed and the patient was encouraged to contact our department should concerns arise.  The patient was seen from  9:00-9:30 am.

## 2025-03-06 ENCOUNTER — TELEMEDICINE (OUTPATIENT)
Dept: PRIMARY CARE | Facility: CLINIC | Age: 60
End: 2025-03-06
Payer: COMMERCIAL

## 2025-03-06 ENCOUNTER — TELEPHONE (OUTPATIENT)
Dept: PRIMARY CARE | Facility: CLINIC | Age: 60
End: 2025-03-06

## 2025-03-06 VITALS
SYSTOLIC BLOOD PRESSURE: 154 MMHG | WEIGHT: 213 LBS | DIASTOLIC BLOOD PRESSURE: 95 MMHG | TEMPERATURE: 98.5 F | BODY MASS INDEX: 31.92 KG/M2 | HEART RATE: 110 BPM

## 2025-03-06 DIAGNOSIS — R42 VERTIGO: Primary | ICD-10-CM

## 2025-03-06 PROCEDURE — 3077F SYST BP >= 140 MM HG: CPT | Performed by: INTERNAL MEDICINE

## 2025-03-06 PROCEDURE — 3080F DIAST BP >= 90 MM HG: CPT | Performed by: INTERNAL MEDICINE

## 2025-03-06 PROCEDURE — 99213 OFFICE O/P EST LOW 20 MIN: CPT | Performed by: INTERNAL MEDICINE

## 2025-03-06 RX ORDER — ACETAMINOPHEN 500 MG
1000 TABLET ORAL EVERY 6 HOURS PRN
COMMUNITY
Start: 2025-02-26 | End: 2025-03-12

## 2025-03-06 RX ORDER — MECLIZINE HYDROCHLORIDE 25 MG/1
25 TABLET ORAL 3 TIMES DAILY PRN
Qty: 30 TABLET | Refills: 3 | Status: SHIPPED | OUTPATIENT
Start: 2025-03-06 | End: 2026-03-06

## 2025-03-06 RX ORDER — ONDANSETRON 4 MG/1
4 TABLET, ORALLY DISINTEGRATING ORAL EVERY 8 HOURS PRN
COMMUNITY
Start: 2025-02-28

## 2025-03-06 RX ORDER — DOCUSATE SODIUM 100 MG/1
100 CAPSULE, LIQUID FILLED ORAL 2 TIMES DAILY
COMMUNITY
Start: 2025-02-26 | End: 2025-03-28

## 2025-03-06 ASSESSMENT — ENCOUNTER SYMPTOMS
NAUSEA: 0
DIZZINESS: 1
APPETITE CHANGE: 1

## 2025-03-06 NOTE — PROGRESS NOTES
"Patient doing a virtual visit for vertigo  Virtual or Telephone Consent    An interactive audio and video telecommunication system which permits real time communications between the patient (at the originating site) and provider (at the distant site) was utilized to provide this telehealth service.   Verbal consent was requested and obtained from Iris Fraire on this date, 03/06/25 for a telehealth visit and the patient's location was confirmed at the time of the visit.     Subjective   Patient ID: Iris Fraire is a 59 y.o. female who presents for Vertigo.    The patient underwent recent laparoscopic total hysterectomy, bilateral salpingectomy, sacrocolpopexy, cystoscopy and repair of bowel serosal tear on 2/26/2025 with  and  from Urogynecology.  She completed the procedure as treatment for uterovaginal prolapse, cystocele, and rectocele.  She is recovering as well as can be expected.  The patient has completed the prescribed pain medication and is now taking Advil and Tylenol which are working relatively well.  She has been eating smaller meals as her appetite has not yet returned to baseline.  The patient states that she has not experienced any vaginal bleeding since the procedure.  She is scheduled for her next follow-up on 4/17/2025.    The patient reports recent onset of dizziness and describes the sensation as \"room spinning\" as if she is on a \"merry-go-round\".  She has a previous history of vertigo, and states that the current symptoms are similar but more intense.  The patient suspects that this is related to the surgery described above as she was tilted downwards during the procedure.  She inquires whether she can restart meclizine as this has worked well for her in the past.  She denies any nausea or unusual headache.      Review of Systems   Constitutional:  Positive for appetite change.   Gastrointestinal:  Negative for nausea.   Genitourinary:  Negative for vaginal bleeding. "   Neurological:  Positive for dizziness.     Objective   Physical Exam  Comfortable appearing on exam.  In no acute distress.  No appreciable neurological deficits on video call.    Assessment/Plan   Problem List Items Addressed This Visit             ICD-10-CM    Vertigo - Primary R42    Relevant Medications    meclizine (Antivert) 25 mg tablet       IMPRESSION/PLAN :      Vertigo  - Prescribed meclizine 25mg up to TID prn as this has worked well in the past.  Instructed patient not to drive until symptoms resolved, and she verbalized agreement.  Call the clinic if symptoms persist or worsen, and will consider referral to Vestibular Therapy.    HTN   - BP is 154/95 at home today. Continue on lisinopril 20mg QD.      Sleep Disturbance  - Advised patient to try Sleep 3 Nature's Bounty Melatonin.    Tonsil Stones  - Following Otolaryngology.    Goiter   - Ultrasound of the thyroid performed on 04/2021 revealed Upper normal sized thyroid gland with few benign-appearing nodules (TIRADS 1), all less than 1 cm. No significant change.  Stable per 12/2023 repeat U/S.     Female Bladder Prolapse  - s/p laparoscopic total hysterectomy, bilateral salpingectomy, sacrocolpopexy, cystoscopy and repair of bowel serosal tear on 2/26/2025 with  and  from Urogynecology.  Patient scheduled for follow-up on 4/17/2025.    Arthritis Pain  - Refilled Meloxicam 15mg, to be taken QD. Take w/ food and Pepcid for protection against adverse effects.  Encouraged patient to take sparingly.     BPPV   - Continue with meclizine 25mg as 1 tablet prn. Previously ordered vestibular rehabilitation. Call if recurs or quality of vertigo changes.      Allergic Rhinitis   - Fluticasone propionate 50mcg/act nasal spray refilled to be taken QD for seasonal allergy symptoms.     IBS with diarrhea   - Mild abdominal tenderness on exam.  Soft abdomen, no guarding or rigidity.  Recommended daily Metamucil to help regulate bowel movements.  Following with GI.     Vitamin D Deficiency  - Continue with ergocalciferol Vitamin D2 as 23019 units once monthly.  Will recheck prior to next visit.     Health Maintenance   - Routine labs 6/2024. Mammo and PAP through [facility].  Last Mammogram 12/2023.  Last colonoscopy 6/2023, repeat due 6/2028. Shingrix UTD.     Follow up according to routine health maintenance, call sooner if needed.       Scribe Attestation  By signing my name below, I, Luigi Garg   attest that this documentation has been prepared under the direction and in the presence of Deyvi Jackson DO.   Monika Escalante 03/06/25 2:17 PM

## 2025-03-06 NOTE — TELEPHONE ENCOUNTER
Pt called would like to talk to Dr. Jackson, in reference to had surgery last week, been having vertigo ever since.  Its not constant.  She stated doctor stated to follow up with PCP    Pt stated she was inverted for surgery. Pt had hysterectomy    Please call pt at  [phone]

## 2025-04-07 ENCOUNTER — APPOINTMENT (OUTPATIENT)
Dept: OTOLARYNGOLOGY | Facility: CLINIC | Age: 60
End: 2025-04-07
Payer: COMMERCIAL

## 2025-04-13 DIAGNOSIS — J30.9 ALLERGIC RHINITIS, UNSPECIFIED: ICD-10-CM

## 2025-04-14 RX ORDER — FLUTICASONE PROPIONATE 50 MCG
2 SPRAY, SUSPENSION (ML) NASAL DAILY
Qty: 48 ML | Refills: 1 | Status: SHIPPED | OUTPATIENT
Start: 2025-04-14

## 2025-04-15 ENCOUNTER — CLINICAL SUPPORT (OUTPATIENT)
Dept: PHYSICAL THERAPY | Facility: CLINIC | Age: 60
End: 2025-04-15
Payer: COMMERCIAL

## 2025-04-15 DIAGNOSIS — R42 VERTIGO: ICD-10-CM

## 2025-04-15 PROCEDURE — 97161 PT EVAL LOW COMPLEX 20 MIN: CPT | Mod: GP

## 2025-04-15 PROCEDURE — 97112 NEUROMUSCULAR REEDUCATION: CPT | Mod: GP

## 2025-04-15 ASSESSMENT — ENCOUNTER SYMPTOMS
LOSS OF SENSATION IN FEET: 0
OCCASIONAL FEELINGS OF UNSTEADINESS: 0
DEPRESSION: 0

## 2025-04-15 NOTE — PROGRESS NOTES
Physical Therapy Initial Evaluation:  Vestibular and Balance      Patient Name:  Iris Fraire   MRN:  [id number]   Date of Evaluation:  04/15/25   Time Calculation  Start Time: 0951  Stop Time: 1045  Time Calculation (min): 54 min  Visit Number:  1  Insurance Information:  2025 20% COINS, 3600 DED (MET) 7100 OOP MAX, 50 VS YIMI YR PT/OT/ST, NO AUTH     1. Vertigo  Referral to Physical Therapy    Follow Up In Physical Therapy          Assessment: Iris Fraire is a 59 year old female referred to outpatient PT for dizziness.  At this time, patient demoing signs of R PC canlithiasis, responding well to 2 R Epley in which nystagmus abolished.  Much of time spent in discussion and education.  Receptive.  Based on patient's examination, concurrent co-morbidities, and presentation, patient's level of complexity is Low.  As a result, recommending continued PT services to facilitate improvement in CLOF.    Problems include:  Activity limitations, Decreased knowledge of HEP, Dizziness / vertigo, Motion sickness, and Participation restrictions    Goals:  By Discharge patient will demo:  [place] in HEP  No falls during POC  DHI improvement by 18% to match MDC  Negative positional testing    Potential to achieve rehab goals is fair.    Plan:  1 times every 1-3 weeks for a total of 6 PRN visits.  Re-assessment after that time.    Activities that may be performed include but are not limited to:  balance, gait, transfers, modalities (as appropriate), e-stim (as appropriate), canalith repositioning maneuvers, therapeutic exercise, therapeutic activities.    Iris Fraire in agreement with and understanding of all discussed this date.    ----------------------------------  ----------------------------------    Subjective:    Onset Date:  2/26/25    HPI:  Iris Fraire is a 59 year old female referred to outpatient PT for dizziness.  Had a complete hysterectomy and bladder repair on February 26th and hasn't been okay since.  Has  "had dizzy spells where room spins around.  Notices when moves head too fast, especially when laying down, room spins.  Feels like notices it more toward the right, but happens both ways.  Tries to close eyes and hold head until it stops.    Taking Meclizine 2x/day since the beginning of March    Dizziness at rest:  None    Patient Goal:  get back to normal and get dizziness to go away     (note:  \"y\" = yes; \"n\" = no)    Hx of:  - Anxiety n  - Headaches / migraines (photo/phonophobia) years ago  - Concussion n  - CVA n  - Neck pain n  - TMD n  - Motion sensitivity (car, boat) y  - Sinus infections once in a while  - Ear infections once in a while, last being two years ago  - Heart conditions (afib, HTN, OH) HTN, lisinopril   - DM n  - Autoimmune disorders n  - Thyroid disorders multinodule goiters  - Alcohol use rarely  - Caffeine use 1 cup per day    Hearing:  - Loss (sudden or gradual) y, gradual, 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 No  How many Times?    Was the patient injured in the fall?    Has trouble stepping onto curb? No  Advised to use a cane or walker to get around safely? No  Often has to rush to toilet? No  Feels unsteady when walking? No  Has lost some feeling in feet? No  Often feels sad or depressed? No  Steadies self on furniture while walking at home? No  Takes medicine that makes them feel lightheaded or more tired than usual? Yes  Comment:meclizine  Worried about Falling? No  Takes medicine to sleep or improve mood? No  Needs to push with hands when rising from a chair? No    Pain:  none    ----------------------------------  ----------------------------------    OBJECTIVE    Observation: " "fear-avoidant with head turns    ROM: c-spine ROM WNL all directions    Strength: no gross abnormalities noted with functional mobility    Coordination: no gross abnormalities noted with functional mobility    Vestibular: (“g” = goggles, \"p\" = positive, \"n\" = negative, \"nt\" = not tested)  Occulomotor -  - ROM: n  - Smooth Pursuit:  n  - Horizontal Saccades:  n  - Vertical Saccades:  n  - Eye Alignment:  n  - Static Visual Acuity: n  - Cover:  n  - Uncover:  n  - Cross Cover:  n  - Skew Deviation:  n  - Convergence:  n    Vestibular-Specific:  - Spontaneous Nystagmus: n, goggles  - Gaze Evoked Nystagmus:  n, goggles  - Horizontal VOR: n  - Vertical VOR:  n  - R Head Thrust:  n  - L Head Thrust:  n  - DVA:  nt  - Head Shake Nystagmus (Horizontal): n, goggles  - Head Shake Nystagmus (Vertical):  n, goggles    Positional Testing:  - R DHP: p, R up torsion, severe, < 30 seconds, goggles  - R HRT:  n, goggles  - R Sidelying Test:  nt  - L DHP: nt  - L HRT:  n, goggles  - L Sidelying Test: nt      Functional Mobility Assessment:  - Gait: no gross abnormalities noted with functional mobility  - Transfers:  tentative upon rising  - Bed Mobility:  dizziness with laying and rising    Outcomes:  DHI:  28%      TREATMENT:  Neuromuscular Re-education (09364): 10 minutes    CRM performed to affected side, R Epley x 2, abolishing nystagmus  Educated extensively on BPPV and vestibular system.  Given handouts for testing / treating BPPV at home and information about the condition.  Educated on precautions to consider.  Educated on how the patient can know if condition is resolved.  ^ CRMs performed for treatment of condition and as \"reset\" of vestibular \"balance system\" for neuromuscular reeducation.     "

## 2025-04-29 ENCOUNTER — HOSPITAL ENCOUNTER (OUTPATIENT)
Dept: RADIOLOGY | Facility: HOSPITAL | Age: 60
Discharge: HOME | End: 2025-04-29
Payer: COMMERCIAL

## 2025-04-29 DIAGNOSIS — H90.A32 MIXED CONDUCTIVE AND SENSORINEURAL HEARING LOSS OF LEFT EAR WITH RESTRICTED HEARING OF RIGHT EAR: ICD-10-CM

## 2025-04-29 PROCEDURE — 70480 CT ORBIT/EAR/FOSSA W/O DYE: CPT

## 2025-04-29 PROCEDURE — 70480 CT ORBIT/EAR/FOSSA W/O DYE: CPT | Performed by: RADIOLOGY

## 2025-04-30 ENCOUNTER — TREATMENT (OUTPATIENT)
Dept: PHYSICAL THERAPY | Facility: CLINIC | Age: 60
End: 2025-04-30
Payer: COMMERCIAL

## 2025-04-30 DIAGNOSIS — R42 VERTIGO: ICD-10-CM

## 2025-04-30 PROCEDURE — 97530 THERAPEUTIC ACTIVITIES: CPT | Mod: GP

## 2025-04-30 NOTE — PROGRESS NOTES
Physical Therapy Treatment      Patient Name: Iris Fraire  MRN: [id number]  Today's Date: 4/30/2025  Visit #: 2  Insurance: 2025 20% COINS, 3600 DED (MET) 7100 OOP MAX, 50 VS YIMI YR PT/OT/ST, NO AUTH   Time Calculation  Start Time: 1632  Stop Time: 1657  Time Calculation (min): 25 min    1. Vertigo  Follow Up In Physical Therapy          ASSESSMENT:  Able to abolish vertiginous complaints and nystagmus with 1 R Epley today.  No adverse events.  This therapist hopeful that this will abolish for good as she seems to have had a recurrence from last session.    GOALS:  By Discharge patient will demo:  [place] in HEP  No falls during POC  DHI improvement by 18% to match MDC  Negative positional testing    PLAN:  Check in with patient as soonest available appointment in about a week.    SUBJECTIVE:  Still getting them, but not lasting as long.  Has had some days where hasn't had it.  Especially noticing it when bending forward and rising again.    OBJECTIVE:      TREATMENT:  Neuromuscular Re-education (45363): 2 minutes    R Epley x 1    Therapeutic Activity (45982): 23 minutes    R DHP: p, R Up Torsion, < 20 seconds (no goggles)    R DHP after R Epley:  negative  L DHP:  negative  R DHP:  negative  ^^ goggles    Discussed BPPV, concerns.  Receptive.

## 2025-05-06 ENCOUNTER — TELEPHONE (OUTPATIENT)
Dept: PRIMARY CARE | Facility: CLINIC | Age: 60
End: 2025-05-06
Payer: COMMERCIAL

## 2025-05-06 NOTE — TELEPHONE ENCOUNTER
Patient has teslon pearls and they aren't helping her cough. Patient is requesting a recommendation of a cough syrup for patient's cough. Please advise.

## 2025-05-07 ENCOUNTER — OFFICE VISIT (OUTPATIENT)
Dept: PRIMARY CARE | Facility: CLINIC | Age: 60
End: 2025-05-07
Payer: COMMERCIAL

## 2025-05-07 VITALS
SYSTOLIC BLOOD PRESSURE: 112 MMHG | BODY MASS INDEX: 31.61 KG/M2 | HEIGHT: 69 IN | WEIGHT: 213.4 LBS | HEART RATE: 103 BPM | OXYGEN SATURATION: 97 % | TEMPERATURE: 98.2 F | RESPIRATION RATE: 16 BRPM | DIASTOLIC BLOOD PRESSURE: 70 MMHG

## 2025-05-07 DIAGNOSIS — R05.1 ACUTE COUGH: ICD-10-CM

## 2025-05-07 DIAGNOSIS — R00.0 TACHYCARDIA: ICD-10-CM

## 2025-05-07 DIAGNOSIS — J06.9 UPPER RESPIRATORY TRACT INFECTION, UNSPECIFIED TYPE: Primary | ICD-10-CM

## 2025-05-07 PROCEDURE — 3074F SYST BP LT 130 MM HG: CPT | Performed by: STUDENT IN AN ORGANIZED HEALTH CARE EDUCATION/TRAINING PROGRAM

## 2025-05-07 PROCEDURE — 99213 OFFICE O/P EST LOW 20 MIN: CPT | Performed by: STUDENT IN AN ORGANIZED HEALTH CARE EDUCATION/TRAINING PROGRAM

## 2025-05-07 PROCEDURE — 3008F BODY MASS INDEX DOCD: CPT | Performed by: STUDENT IN AN ORGANIZED HEALTH CARE EDUCATION/TRAINING PROGRAM

## 2025-05-07 PROCEDURE — 3078F DIAST BP <80 MM HG: CPT | Performed by: STUDENT IN AN ORGANIZED HEALTH CARE EDUCATION/TRAINING PROGRAM

## 2025-05-07 PROCEDURE — 1036F TOBACCO NON-USER: CPT | Performed by: STUDENT IN AN ORGANIZED HEALTH CARE EDUCATION/TRAINING PROGRAM

## 2025-05-07 RX ORDER — AZITHROMYCIN 250 MG/1
TABLET, FILM COATED ORAL
Qty: 6 TABLET | Refills: 0 | Status: SHIPPED | OUTPATIENT
Start: 2025-05-07 | End: 2025-05-12

## 2025-05-07 RX ORDER — HYDROCODONE BITARTRATE AND HOMATROPINE METHYLBROMIDE ORAL SOLUTION 5; 1.5 MG/5ML; MG/5ML
5 LIQUID ORAL EVERY 6 HOURS PRN
Qty: 100 ML | Refills: 0 | Status: SHIPPED | OUTPATIENT
Start: 2025-05-07 | End: 2025-05-12

## 2025-05-07 ASSESSMENT — ENCOUNTER SYMPTOMS
DIZZINESS: 0
CHILLS: 0
RHINORRHEA: 1
SORE THROAT: 1
NAUSEA: 0
DIAPHORESIS: 0
COUGH: 1
VOMITING: 0
SINUS PAIN: 0
SHORTNESS OF BREATH: 0
SINUS PRESSURE: 1
DIARRHEA: 1
FEVER: 0
LIGHT-HEADEDNESS: 0

## 2025-05-07 ASSESSMENT — PATIENT HEALTH QUESTIONNAIRE - PHQ9
2. FEELING DOWN, DEPRESSED OR HOPELESS: NOT AT ALL
SUM OF ALL RESPONSES TO PHQ9 QUESTIONS 1 AND 2: 0
1. LITTLE INTEREST OR PLEASURE IN DOING THINGS: NOT AT ALL

## 2025-05-07 ASSESSMENT — PAIN SCALES - GENERAL: PAINLEVEL_OUTOF10: 4

## 2025-05-07 NOTE — PROGRESS NOTES
Subjective   Patient ID: Iris Fraire is a 59 y.o. female who presents for URI (Has had symptoms since Saturday 5/3/25/Cough, low grade fever, sneezing, diarrhea. /Patient did a COVID test and tested negative.).  History of Present Illness  Iris Fraire is a 59 year old female with recurrent bronchitis who presents with a sore throat and persistent cough.    Symptoms began on Friday with a sore throat, progressing to a persistent non-productive cough causing significant rib pain. The cough is associated with shortness of breath and rib pain, primarily when coughing.    She has a history of recurrent bronchitis, typically occurring once a year, usually in the winter or fall. This episode is unusual as it is occurring later in the year. She has previously been treated for similar symptoms.    She experienced a fever of 100.0°F earlier today, though it was not present at the time of the visit. She also has mild right ear pain, runny nose, and postnasal drip. She uses Flonase daily, which helps prevent nasal congestion.    She experiences sinus pressure under her eyes but no significant sinus pain. She also reports a sore throat associated with coughing and postnasal drip.    She reports episodes of vertigo consistent with a history of benign paroxysmal positional vertigo, with no recent changes in hearing or vision.    Current medications include Robitussin DM, Mucinex, DayQuil, and NyQuil. She has tried Tessalon Perles in the past without significant relief. She has allergies to hydrocortisone, prednisone, and contrast dye. She has previously taken Z-Ashvin and Augmentin without significant issues, though Augmentin causes more diarrhea.    She maintains hydration with at least 48 ounces of water daily, reduced coffee intake to one cup per day, and increased herbal tea consumption. Her pulse has been elevated, with readings in the low 100s bpm for the past five days, peaking at 133 bpm last night.    Review of Systems  "  Constitutional:  Negative for chills, diaphoresis and fever.   HENT:  Positive for ear pain (mild right), postnasal drip, rhinorrhea, sinus pressure (mild bilateral maxillary) and sore throat (mild a/w cough and pnd). Negative for ear discharge, hearing loss and sinus pain.    Eyes:  Negative for visual disturbance.   Respiratory:  Positive for cough (non productive). Negative for shortness of breath.    Cardiovascular:  Negative for chest pain.   Gastrointestinal:  Positive for diarrhea (not unusual per patient). Negative for nausea and vomiting.   Neurological:  Negative for dizziness (chronic), syncope and light-headedness.       Objective     /70 (BP Location: Left arm, Patient Position: Sitting, BP Cuff Size: Adult)   Pulse 103   Temp 36.8 °C (98.2 °F) (Skin)   Resp 16   Ht 1.753 m (5' 9\")   Wt 96.8 kg (213 lb 6.4 oz)   SpO2 97%   BMI 31.51 kg/m²      Physical Exam  GENERAL: Alert, cooperative, well developed, no acute distress, no diaphoresis.  HEENT: Normocephalic, normal oropharynx, moist mucous membranes, ear canals and tympanic membranes normal bilaterally, no oral lesions, no oral erythema, ears without obvious infection.  NECK: Supple, trachea midline, no cervical lymphadenopathy.  CHEST: Clear to auscultation bilaterally, no wheezes, rhonchi, or rales.  CARDIOVASCULAR: Normal heart rate and rhythm, S1 and S2 normal without murmurs, rubs, or gallops.  EXTREMITIES: No cyanosis or edema.  NEUROLOGICAL: Moves all extremities without gross motor deficit.    Assessment & Plan  Acute upper respiratory infection  Cough  Acute upper respiratory infection with symptoms of sore throat, persistent cough, rhinorrhea, postnasal drip, and sinus pressure. Cough causing rib pain. No wheezing or pneumonia on lung examination. Likely viral etiology with possible bacterial superinfection. Discussed antibiotic options, preferring Z-Ashvin due to her tolerance and previous use without issues. Hycodan cough syrup " discussed for symptomatic relief, with caution regarding drowsiness and alcohol use.  - Prescribe Z-Ashvin  - Continue Flonase for postnasal drip  - Prescribe Hycodan cough syrup for symptomatic relief, aware not to drink alcohol or drive with this as it can be sedating/drowsy  - Advise use of Mucinex or guaifenesin as needed  - Advise to seek ER care if chest pain, shortness of breath, or rib pain worsens  -F/u 1-2 weeks if not much better    Mild Tachycardia  Tachycardia with heart rate over 100 bpm for the last five days, peaking at 133 bpm reportedly at home for a few seconds-likely a fluke. Likely related to dehydration and upper respiratory infection. Advised to monitor heart rate and seek urgent care if heart rate remains above 130 bpm for more than a minute or two. Discussed potential for a blood clot causing chest pain, tachycardia, and dyspnea, but clinically unlikely today given her URI symptoms.  - Advise increased hydration and above plan  - Monitor heart rate  - ER if heart rate remains above 130 bpm for more than a minute or two      Results         Phill Carrasco,      This medical note was created with the assistance of artificial intelligence (AI) for documentation purposes. The content has been reviewed and confirmed by the healthcare provider for accuracy and completeness. Patient consented to the use of audio recording and use of AI during their visit.

## 2025-05-09 ENCOUNTER — APPOINTMENT (OUTPATIENT)
Facility: CLINIC | Age: 60
End: 2025-05-09
Payer: COMMERCIAL

## 2025-05-09 ENCOUNTER — APPOINTMENT (OUTPATIENT)
Dept: PRIMARY CARE | Facility: CLINIC | Age: 60
End: 2025-05-09
Payer: COMMERCIAL

## 2025-05-09 DIAGNOSIS — H90.A32 MIXED CONDUCTIVE AND SENSORINEURAL HEARING LOSS OF LEFT EAR WITH RESTRICTED HEARING OF RIGHT EAR: Primary | ICD-10-CM

## 2025-05-09 DIAGNOSIS — H90.A21 SENSORINEURAL HEARING LOSS (SNHL) OF RIGHT EAR WITH RESTRICTED HEARING OF LEFT EAR: ICD-10-CM

## 2025-05-09 PROCEDURE — 1036F TOBACCO NON-USER: CPT

## 2025-05-09 PROCEDURE — 99212 OFFICE O/P EST SF 10 MIN: CPT

## 2025-05-09 NOTE — PROGRESS NOTES
Patient ID: Iris Fraire is a 59 y.o. female who presents for virtual follow-up visit.     An interactive audio and video telecommunication system which permits real time communications between the patient (at the originating site) and provider (at the distant site) was utilized to provide this telehealth service.   Verbal consent was requested and obtained from Iris Fraire on this date, 05/09/25 for a telehealth visit and the patient's location was confirmed at the time of the visit.     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 female with a history of mixed left hearing loss and right sensorineural hearing loss who virtually presents for subsequent evaluation of bilateral hearing difficulty and to review recent CT IAC results. Recent CT IAC results reviewed in detail with the patient, which revealed normal CT assessment of the temporal bones bilaterally with no evidence of SSCD, cholesteatoma, or otosclerosis. Patient offered reassurance and counseling on the current clinical findings and management recommendations.     PLAN:  I recommended patient schedules a hearing aid evaluation, if they have not already done so. Patient declined recommendation at this time.  Patient advised to wear ear hearing protection while in the presence of loud sounds. The patient was also counseled to use effective communication strategies such as asking the speaker to gain attention prior to speaking, speaking in the same room, repeating words that were heard, etc.   Follow-up: Patient may schedule for follow-up in 3 months for routine removal of impacted cerumen, sooner as needed. Patient is agreeable to this plan, all questions were answered to patient's satisfaction.     At today's virtual  visit with Iris Fraire , the number of minutes I spent providing patient care was 10. More than 50% of that time was spent counseling the patient on possible etiologies, test results, treatment options and care coordination.    Subjective   HPI: Iris Fraire is a 59 y.o. female who presents for a virtual follow-up evaluation to review recent CT IAC results for mixed conductive and sensorineural hearing loss in the left ear, and sensorineural hearing loss in the right ear. Since last visit on 2/17/25, the patient states that she continues to notice mild bilateral hearing difficulty which has remained unchanged. Reports that she has recently been seeing vestibular PT for BPPV management which has improved symptoms. Mentions she is not currently interested in obtaining hearing aids.    RECALL 2/17/25:  HPI: Iris Fraire is a 59 y.o. female who presents for routine 4 month follow-up for earwax removal. Patient last seen by my ENT partner Dr. Joyce Mar on 10/21/24 for cerumen impaction removal. Since last visit, patient reports that she has noticed mild intermittent left otalgia which seems to improve with use of o.t.c. debrox. She denies any current ear pain. Reports that she continues to notice bilateral hearing difficulty, left ear greater than right. Denies any recent hearing loss progression or fluctuations. Denies any significant hearing difficulty with daily communication. She does note her history of recurrent ear infections in childhood with PET insertion in childhood. When asked about the presence of associated otologic symptoms, including tinnitus, ear drainage, ear itching, aural fullness, autophony, dizziness or vertigo, the patient admits to none. She has been using o.t,c. Debrox ear drops to loosen wax immediately prior to this visit.   ASSESSMENT: Iris Fraire is a 59 y.o. female who presents for a follow up encounter with symptoms and clinical findings that are consistent with bilateral cerumen  impaction, mixed conductive and sensorineural hearing loss in the right ear, and sensorineural hearing loss in the left ear. Using appropriate instrumentation, impacted cerumen was successfully removed from the affected EAC(s). Exam today revealed bilateral TM retraction with central bulging/whitish appearance of the left TM concerning for left middle ear cholesteatoma. Reassurance provided to patient that otologic exam today revealed no evidence of acute infection/inflammation in the EAC bilaterally and that TM appears with no evidence of infection, effusion, or perforation bilaterally.  Audiogram reviewed in detail with the patient today, which revealed moderate to mild mixed conductive and sensorineural hearing loss in the left ear, and mild to moderate high-frequency sensorineural hearing loss in the right ear.   PLAN:   I recommended CT IAC wo contrast for further evaluation for left middle ear cholesteatoma. Order e-submitted and patient instructed to contact  radiology to coordinate imaging. Patient informed that results will be reviewed at virtual follow-up visit.  I counseled patient on safe interventions for cerumen management at home. Patient may wash the external ear with a cloth. I reinforced education to the patient that they should avoid using cotton tipped applicators, tissues, paper, or any rigid object in the ear canal. I explained to the patient that doing so may cause wax to be pushed back into the ear canal and stuck and also risks injury to the ear canal and ear drum.   Follow-up: Patient may follow-up virtually after obtaining CT IAC to review the results. Patient may schedule for routine evaluation for the removal of cerumen impaction in 6 months. They may follow up with me as requested, sooner if needed. All questions answered to patient satisfaction.    PATIENT HISTORY:  Medical History[1]   Surgical History[2]   RX Allergies[3]   Current Medications[4]   Tobacco Use: Low Risk  (5/7/2025)     Patient History     Smoking Tobacco Use: Never     Smokeless Tobacco Use: Never     Passive Exposure: Not on file   Recent Concern: Tobacco Use - Medium Risk (2/17/2025)    Received from [facility]    Patient History     Smoking Tobacco Use: Former     Smokeless Tobacco Use: Never     Passive Exposure: Not on file      Alcohol Use: Not on file      Social History     Substance and Sexual Activity   Drug Use Never        Review of Systems   All other systems negative.     Objective     TELEHEALTH PHYSICAL EXAM:  PSYCH: Alert and oriented with appropriate mood and affect  VOICE: No hoarseness or other audible abnormality  RESPIRATION: Breathing comfortably, no stridor; normal breathing effort    CONSTITUTIONAL: No acute distress, normal facial features; No fever; no chills  CV: No cyanosis visible on the face and neck area  EYES: Pupils equal and round; no erythema; conjunctiva clear; sclera white  NEURO: Alert and oriented, able to raise eyebrows symmetrical bilateral, smile with no facial droop, able to swallow  HEAD AND FACE: Symmetric facial features, no masses or lesions Right ear examination: External ear normal. Left ear examination: External ear normal.  NOSE: External nose midline  ORAL CAVITY: No lesions of external lips; uvula is midline; tongue with good mobility; no gross mass in oral cavity; mucosa appears pink  NECK/LYMPH: No obvious deformity or lesions; trachea is midline     RESULTS:  -I personally reviewed the patient's CT IAC wo contrast from 4/9/25, with the following summary of findings reported: Normal CT assessment of the temporal bones.         Marianna Yo, APRN-CNP        [1] No past medical history on file.  [2]   Past Surgical History:  Procedure Laterality Date    APPENDECTOMY  10/28/2015    Appendectomy    CHOLECYSTECTOMY  02/01/2016    Cholecystectomy Laparoscopic    OTHER SURGICAL HISTORY  10/27/2015    Incisional Hernia Repair    OTHER SURGICAL HISTORY  10/27/2015     Right Hemicolectomy   [3]   Allergies  Allergen Reactions    Hydrocortisone Other    Iodinated Contrast Media Hives    Prednisone Other   [4]   Current Outpatient Medications:     azithromycin (Zithromax) 250 mg tablet, Take 2 tablets (500 mg) by mouth once daily for 1 day, THEN 1 tablet (250 mg) once daily for 4 days. Take 2 tabs (500 mg) by mouth today, than 1 daily for 4 days., Disp: 6 tablet, Rfl: 0    ergocalciferol (Vitamin D-2) 1.25 MG (44140 UT) capsule, Take 1 capsule (50,000 Units) by mouth every 30 (thirty) days., Disp: 12 capsule, Rfl: 3    estradiol (Estrace) 0.01 % (0.1 mg/gram) vaginal cream, Apply 1/2 gram into the vagina nightly x 2 weeks, then twice weekly at night., Disp: 42.5 g, Rfl: 3    famotidine (Pepcid) 20 mg tablet, TAKE 1 TABLET BY MOUTH EVERY DAY, Disp: 90 tablet, Rfl: 2    fluticasone (Flonase) 50 mcg/actuation nasal spray, ADMINISTER 2 SPRAYS INTO EACH NOSTRIL ONCE DAILY. SHAKE GENTLY. BEFORE FIRST USE, PRIME PUMP. AFTER USE, CLEAN TIP AND REPLACE CAP., Disp: 48 mL, Rfl: 1    hydrocodone-homatropine (Hycodan) 5-1.5 mg/5 mL syrup, Take 5 mL by mouth every 6 hours if needed for cough for up to 5 days., Disp: 100 mL, Rfl: 0    hyoscyamine (Anaspaz, Levsin) 0.125 mg tablet, TAKE 1 TABLET (0.125 MG) BY MOUTH 2 TIMES A DAY AS NEEDED FOR CRAMPING., Disp: 180 tablet, Rfl: 0    ibuprofen 600 mg tablet, Take 1 tablet (600 mg) by mouth every 6 hours if needed., Disp: , Rfl:     lisinopril 20 mg tablet, Take 1 tablet (20 mg) by mouth once daily., Disp: 90 tablet, Rfl: 3    meclizine (Antivert) 25 mg tablet, Take 1 tablet (25 mg) by mouth 3 times a day as needed for dizziness., Disp: 30 tablet, Rfl: 3    meclizine (Antivert) 25 mg tablet, Take 1 tablet (25 mg) by mouth 3 times a day as needed for dizziness., Disp: 30 tablet, Rfl: 3    meloxicam (Mobic) 15 mg tablet, TAKE 1 TABLET BY MOUTH DAILY WITH FOOD AS NEEDED FOR PAIN, Disp: 90 tablet, Rfl: 3    methocarbamol (Robaxin) 500 mg tablet, Take 1  tablet (500 mg) by mouth 3 times a day., Disp: 30 tablet, Rfl: 1    omega-3 fatty acids-fish oil (Fish OiL) 300-500 mg capsule, Take by mouth., Disp: , Rfl:     ondansetron ODT (Zofran-ODT) 4 mg disintegrating tablet, Dissolve 1 tablet (4 mg) in the mouth every 8 hours if needed., Disp: , Rfl:

## 2025-05-12 ENCOUNTER — APPOINTMENT (OUTPATIENT)
Dept: PHYSICAL THERAPY | Facility: CLINIC | Age: 60
End: 2025-05-12
Payer: COMMERCIAL

## 2025-05-12 NOTE — PROGRESS NOTES
Physical Therapy Treatment      Patient Name: Iris Fraire  MRN: [id number]  Today's Date: 5/12/2025  Visit #: 3  Insurance: 2025 20% COINS, 3600 DED (MET) 7100 OOP MAX, 50 VS YIMI YR PT/OT/ST, NO AUTH        No diagnosis found.      ASSESSMENT:  Able to abolish vertiginous complaints and nystagmus with 1 R Epley today.  No adverse events.  This therapist hopeful that this will abolish for good as she seems to have had a recurrence from last session.    GOALS:  By Discharge patient will demo:  [place] in HEP  No falls during POC  DHI improvement by 18% to match MDC  Negative positional testing    PLAN:  Check in with patient as soonest available appointment in about a week.    SUBJECTIVE:  Still getting them, but not lasting as long.  Has had some days where hasn't had it.  Especially noticing it when bending forward and rising again.    OBJECTIVE:      TREATMENT:  Neuromuscular Re-education (17908): 2 minutes    R Epley x 1    Therapeutic Activity (98247): 23 minutes    R DHP: p, R Up Torsion, < 20 seconds (no goggles)    R DHP after R Epley:  negative  L DHP:  negative  R DHP:  negative  ^^ goggles    Discussed BPPV, concerns.  Receptive.

## 2025-05-29 ENCOUNTER — TELEPHONE (OUTPATIENT)
Dept: PRIMARY CARE | Facility: CLINIC | Age: 60
End: 2025-05-29
Payer: COMMERCIAL

## 2025-05-29 DIAGNOSIS — Z00.00 HEALTHCARE MAINTENANCE: Primary | ICD-10-CM

## 2025-06-20 LAB
ALBUMIN SERPL-MCNC: 4.3 G/DL (ref 3.6–5.1)
ALP SERPL-CCNC: 63 U/L (ref 37–153)
ALT SERPL-CCNC: 12 U/L (ref 6–29)
ANION GAP SERPL CALCULATED.4IONS-SCNC: 5 MMOL/L (CALC) (ref 7–17)
AST SERPL-CCNC: 15 U/L (ref 10–35)
BASOPHILS # BLD AUTO: 49 CELLS/UL (ref 0–200)
BASOPHILS NFR BLD AUTO: 0.9 %
BILIRUB SERPL-MCNC: 0.5 MG/DL (ref 0.2–1.2)
BUN SERPL-MCNC: 16 MG/DL (ref 7–25)
CALCIUM SERPL-MCNC: 9.3 MG/DL (ref 8.6–10.4)
CHLORIDE SERPL-SCNC: 107 MMOL/L (ref 98–110)
CHOLEST SERPL-MCNC: 172 MG/DL
CHOLEST/HDLC SERPL: 3.5 (CALC)
CO2 SERPL-SCNC: 30 MMOL/L (ref 20–32)
CREAT SERPL-MCNC: 0.75 MG/DL (ref 0.5–1.03)
EGFRCR SERPLBLD CKD-EPI 2021: 92 ML/MIN/1.73M2
EOSINOPHIL # BLD AUTO: 140 CELLS/UL (ref 15–500)
EOSINOPHIL NFR BLD AUTO: 2.6 %
ERYTHROCYTE [DISTWIDTH] IN BLOOD BY AUTOMATED COUNT: 12.5 % (ref 11–15)
GLUCOSE SERPL-MCNC: 85 MG/DL (ref 65–99)
HCT VFR BLD AUTO: 43.1 % (ref 35–45)
HDLC SERPL-MCNC: 49 MG/DL
HGB BLD-MCNC: 13.9 G/DL (ref 11.7–15.5)
LDLC SERPL CALC-MCNC: 106 MG/DL (CALC)
LYMPHOCYTES # BLD AUTO: 1755 CELLS/UL (ref 850–3900)
LYMPHOCYTES NFR BLD AUTO: 32.5 %
MCH RBC QN AUTO: 29.7 PG (ref 27–33)
MCHC RBC AUTO-ENTMCNC: 32.3 G/DL (ref 32–36)
MCV RBC AUTO: 92.1 FL (ref 80–100)
MONOCYTES # BLD AUTO: 346 CELLS/UL (ref 200–950)
MONOCYTES NFR BLD AUTO: 6.4 %
NEUTROPHILS # BLD AUTO: 3110 CELLS/UL (ref 1500–7800)
NEUTROPHILS NFR BLD AUTO: 57.6 %
NONHDLC SERPL-MCNC: 123 MG/DL (CALC)
PLATELET # BLD AUTO: 209 THOUSAND/UL (ref 140–400)
PMV BLD REES-ECKER: 9.5 FL (ref 7.5–12.5)
POTASSIUM SERPL-SCNC: 4.2 MMOL/L (ref 3.5–5.3)
PROT SERPL-MCNC: 6.7 G/DL (ref 6.1–8.1)
RBC # BLD AUTO: 4.68 MILLION/UL (ref 3.8–5.1)
SODIUM SERPL-SCNC: 142 MMOL/L (ref 135–146)
TRIGL SERPL-MCNC: 80 MG/DL
TSH SERPL-ACNC: 2.04 MIU/L (ref 0.4–4.5)
WBC # BLD AUTO: 5.4 THOUSAND/UL (ref 3.8–10.8)

## 2025-06-26 DIAGNOSIS — Z00.00 ENCOUNTER FOR GENERAL ADULT MEDICAL EXAMINATION WITHOUT ABNORMAL FINDINGS: ICD-10-CM

## 2025-06-26 RX ORDER — MELOXICAM 15 MG/1
15 TABLET ORAL DAILY PRN
Qty: 90 TABLET | Refills: 3 | Status: SHIPPED | OUTPATIENT
Start: 2025-06-26

## 2025-06-27 ENCOUNTER — APPOINTMENT (OUTPATIENT)
Dept: PRIMARY CARE | Facility: CLINIC | Age: 60
End: 2025-06-27
Payer: COMMERCIAL

## 2025-07-11 ENCOUNTER — APPOINTMENT (OUTPATIENT)
Dept: PRIMARY CARE | Facility: CLINIC | Age: 60
End: 2025-07-11
Payer: COMMERCIAL

## 2025-07-11 VITALS
BODY MASS INDEX: 32.43 KG/M2 | OXYGEN SATURATION: 98 % | HEIGHT: 68 IN | DIASTOLIC BLOOD PRESSURE: 70 MMHG | RESPIRATION RATE: 16 BRPM | WEIGHT: 214 LBS | SYSTOLIC BLOOD PRESSURE: 128 MMHG | HEART RATE: 76 BPM

## 2025-07-11 DIAGNOSIS — M62.830 BACK SPASM: ICD-10-CM

## 2025-07-11 DIAGNOSIS — E04.1 THYROID NODULE: ICD-10-CM

## 2025-07-11 DIAGNOSIS — Z00.00 HEALTHCARE MAINTENANCE: Primary | ICD-10-CM

## 2025-07-11 DIAGNOSIS — R42 DIZZINESS: ICD-10-CM

## 2025-07-11 DIAGNOSIS — R00.0 TACHYCARDIA: ICD-10-CM

## 2025-07-11 PROCEDURE — 3008F BODY MASS INDEX DOCD: CPT | Performed by: INTERNAL MEDICINE

## 2025-07-11 PROCEDURE — 3074F SYST BP LT 130 MM HG: CPT | Performed by: INTERNAL MEDICINE

## 2025-07-11 PROCEDURE — 99396 PREV VISIT EST AGE 40-64: CPT | Performed by: INTERNAL MEDICINE

## 2025-07-11 PROCEDURE — 1036F TOBACCO NON-USER: CPT | Performed by: INTERNAL MEDICINE

## 2025-07-11 PROCEDURE — 93000 ELECTROCARDIOGRAM COMPLETE: CPT | Performed by: INTERNAL MEDICINE

## 2025-07-11 PROCEDURE — 3078F DIAST BP <80 MM HG: CPT | Performed by: INTERNAL MEDICINE

## 2025-07-11 RX ORDER — METHOCARBAMOL 500 MG/1
500 TABLET, FILM COATED ORAL 3 TIMES DAILY
Qty: 30 TABLET | Refills: 1 | Status: SHIPPED | OUTPATIENT
Start: 2025-07-11

## 2025-07-11 ASSESSMENT — ENCOUNTER SYMPTOMS
ROS GI COMMENTS: POSITIVE FOR HEARTBURN.
ABDOMINAL PAIN: 0
TROUBLE SWALLOWING: 0
DIARRHEA: 1
DIZZINESS: 1

## 2025-07-11 NOTE — PROGRESS NOTES
Patient here for a physical    Subjective   Patient ID: Iris Fraire is a 60 y.o. female who presents for Annual Exam.    The patient reports sporadic episodes of tachycardia of up to 130 bpm despite being at rest, according to her digital watch.  Symptoms occur about twice weekly.  The patient suspects that this may be related to increased life stress due to issues at work and concern for her mother's condition.  She denies any dyspnea or palpitations.     The patient continues to experience episodes of vertigo upon laying down at night time and turning her head to the right side.  She is undergoing Physical Therapy, and finds that this is helping.     The patient reports occasional back pain related to muscle spasm.    The patient mentions two small painless mobile masses on the knee since 7/4/2025.     The patient notes intermittent heartburn, and inquires whether she is okay to take famotidine 20mg once daily.  She denies any neck fullness or dysphagia.    The patient mentions mild hearing impairment and suspects that she will need a hearing aid device in the future.  She follows regularly with a Dentist.    The patient notes ongoing loose stools, though she believes that this represents her new baseline.  She denies any abdominal pain or bowel changes.      Review of Systems   HENT:  Positive for hearing loss. Negative for trouble swallowing.         Negative for neck fullness.   Cardiovascular:         Positive for tachycardia.   Gastrointestinal:  Positive for diarrhea. Negative for abdominal pain.        Positive for heartburn.   Skin:         Positive for lipoma on the knee.   Neurological:  Positive for dizziness.       Objective   Physical Exam  Constitutional:       Appearance: Normal appearance.   Neck:      Vascular: No carotid bruit.   Cardiovascular:      Rate and Rhythm: Normal rate and regular rhythm.      Heart sounds: Normal heart sounds.   Pulmonary:      Effort: Pulmonary effort is normal.       Breath sounds: Normal breath sounds.   Abdominal:      General: Bowel sounds are normal.      Palpations: Abdomen is soft.      Tenderness: There is no abdominal tenderness.   Skin:     General: Skin is warm and dry.   Neurological:      General: No focal deficit present.      Mental Status: She is alert and oriented to person, place, and time. Mental status is at baseline.   Psychiatric:         Mood and Affect: Mood normal.         Behavior: Behavior normal.       Assessment/Plan   Problem List Items Addressed This Visit           ICD-10-CM    Back spasm M62.830    Relevant Medications    methocarbamol (Robaxin) 500 mg tablet    Dizziness R42    Relevant Orders    Vascular US Carotid Artery Duplex Bilateral    Tachycardia R00.0    Relevant Orders    Holter or Event Cardiac Monitor     Other Visit Diagnoses         Codes      Healthcare maintenance    -  Primary Z00.00    Relevant Orders    ECG 12 lead (Clinic Performed) (Completed)      Thyroid nodule     E04.1    Relevant Orders    US thyroid            CPE Performed  -  Discussed healthy diet and regular exercise.    -  Physical exam overall unremarkable. Immunizations reviewed and updated accordingly. Healthy lifestyle choices discussed (tobacco avoidance, appropriate alcohol use, avoidance of illicit substances).   -  Patient is wearing seatbelt.   -  Screening lab work ordered as indicated.    -  Age appropriate screening tests reviewed with patient.        EKG unremarkable as compared to previous.     IMPRESSION/PLAN :    Tachycardia  - HR 76 bpm in the office today.  Ordered Holter or Event Cardiac Monitor, and advised patient to set this up today to complete prior to vacation. Will consider additional management pending results.     Goiter   - Ultrasound of the thyroid performed on 04/2021 revealed Upper normal sized thyroid gland with few benign-appearing nodules (TIRADS 1), all less than 1 cm. No significant change.  Stable per 12/2023 repeat U/S.   Ordered repeat Thyroid Ultrasound for 2025.    Lipoma on the Knee  - Unusual location.  Asymptomatic.  Monitor for now.  Call the clinic if symptoms persist or worsen, and she will see her orthopedic physician if this happens.    Dizziness  - Ordered Vascular Ultrasound Carotid Artery Duplex Bilateral.  Doing well with Physical Therapy.  Previously prescribed meclizine 25mg up to TID prn as this has worked well in the past.  Instructed patient not to drive until symptoms resolved, and she verbalized agreement.  Call the clinic if symptoms persist or worsen, and will consider referral to Vestibular Therapy.    Back Spasm  - Prescribed methocarbamol 500mg TID.  Call the clinic if symptoms persist or worsen.     HTN   - BP is 128/70 at home today. Continue on lisinopril 20mg QD.      Sleep Disturbance  - Advised patient to try Sleep 3 Nature's Bounty Melatonin.    Tonsil Stones  - Following Otolaryngology.      GERD  - Maintained with famotidine 20mg once daily.  Call the clinic if symptoms persist or worsen.    Female Bladder Prolapse  - s/p laparoscopic total hysterectomy, bilateral salpingectomy, sacrocolpopexy, cystoscopy and repair of bowel serosal tear on 2/26/2025 with  and  from Urogynecology.  Patient scheduled for follow-up on 4/17/2025.     Arthritis Pain  - Refilled Meloxicam 15mg, to be taken QD. Take w/ food and Pepcid for protection against adverse effects.  Encouraged patient to take sparingly.     BPPV   - Continue with meclizine 25mg as 1 tablet prn. Previously ordered vestibular rehabilitation. Call if recurs or quality of vertigo changes.      Allergic Rhinitis   - Fluticasone propionate 50mcg/act nasal spray refilled to be taken QD for seasonal allergy symptoms.     IBS with diarrhea   - Mild abdominal tenderness on exam.  Soft abdomen, no guarding or rigidity.  Recommended daily Metamucil to help regulate bowel movements. Following with GI.     Vitamin D Deficiency  -  "Continue with ergocalciferol Vitamin D2 as 18552 units once monthly.  Will recheck prior to next visit.     Health Maintenance   - Routine labs 6/2025. Mammo and PAP through [facility].  Last Mammogram 11/2024.  Last colonoscopy 6/2023, repeat due 6/2028. Shingrix first dose, Tdap (10/5/2018) UTD.     Follow up according to routine health maintenance, call sooner if needed.       \"I, Dr. Jackson, personally performed the services described in the documentation as scribed by Monika Escalante in my presence, and confirm it is both accurate and complete.   Scribe Attestation     Scribe Attestation  By signing my name below, I, Monika Escalante, Scribe   attest that this documentation has been prepared under the direction and in the presence of Deyvi Jackson DO.   Monika Escalante 07/11/25 9:41 AM   "

## 2025-08-11 ENCOUNTER — HOSPITAL ENCOUNTER (OUTPATIENT)
Dept: CARDIOLOGY | Facility: CLINIC | Age: 60
Discharge: HOME | End: 2025-08-11
Payer: COMMERCIAL

## 2025-08-11 DIAGNOSIS — R00.0 TACHYCARDIA: ICD-10-CM

## 2025-08-11 PROCEDURE — 93242 EXT ECG>48HR<7D RECORDING: CPT

## 2025-08-16 DIAGNOSIS — Z00.00 ENCOUNTER FOR GENERAL ADULT MEDICAL EXAMINATION WITHOUT ABNORMAL FINDINGS: ICD-10-CM

## 2025-08-18 ENCOUNTER — HOSPITAL ENCOUNTER (OUTPATIENT)
Dept: RADIOLOGY | Facility: CLINIC | Age: 60
Discharge: HOME | End: 2025-08-18
Payer: COMMERCIAL

## 2025-08-18 DIAGNOSIS — R42 DIZZINESS: ICD-10-CM

## 2025-08-18 DIAGNOSIS — E04.1 THYROID NODULE: ICD-10-CM

## 2025-08-18 PROCEDURE — 93880 EXTRACRANIAL BILAT STUDY: CPT | Performed by: RADIOLOGY

## 2025-08-18 PROCEDURE — 93880 EXTRACRANIAL BILAT STUDY: CPT | Mod: 59

## 2025-08-18 PROCEDURE — 76536 US EXAM OF HEAD AND NECK: CPT

## 2025-08-18 PROCEDURE — 76536 US EXAM OF HEAD AND NECK: CPT | Performed by: RADIOLOGY

## 2025-08-18 RX ORDER — LISINOPRIL 20 MG/1
20 TABLET ORAL DAILY
Qty: 90 TABLET | Refills: 3 | Status: SHIPPED | OUTPATIENT
Start: 2025-08-18

## 2025-08-25 ENCOUNTER — APPOINTMENT (OUTPATIENT)
Facility: CLINIC | Age: 60
End: 2025-08-25
Payer: COMMERCIAL

## 2025-08-26 ENCOUNTER — APPOINTMENT (OUTPATIENT)
Facility: CLINIC | Age: 60
End: 2025-08-26
Payer: COMMERCIAL

## 2025-09-08 ENCOUNTER — APPOINTMENT (OUTPATIENT)
Facility: CLINIC | Age: 60
End: 2025-09-08
Payer: COMMERCIAL

## 2026-07-17 ENCOUNTER — APPOINTMENT (OUTPATIENT)
Dept: PRIMARY CARE | Facility: CLINIC | Age: 61
End: 2026-07-17
Payer: COMMERCIAL